# Patient Record
Sex: MALE | Race: WHITE | NOT HISPANIC OR LATINO | Employment: FULL TIME | URBAN - METROPOLITAN AREA
[De-identification: names, ages, dates, MRNs, and addresses within clinical notes are randomized per-mention and may not be internally consistent; named-entity substitution may affect disease eponyms.]

---

## 2017-01-06 ENCOUNTER — ALLIED HEALTH/NURSE VISIT (OUTPATIENT)
Dept: CARDIOLOGY | Facility: CLINIC | Age: 37
End: 2017-01-06
Attending: FAMILY MEDICINE
Payer: COMMERCIAL

## 2017-01-06 DIAGNOSIS — R00.2 PALPITATIONS: ICD-10-CM

## 2017-01-06 PROCEDURE — 0296T ZIO PATCH HOLTER: CPT

## 2017-01-06 PROCEDURE — 0298T ZZC EXT ECG > 48HR TO 21 DAY REVIEW AND INTERPRETATN: CPT

## 2017-01-06 NOTE — PROGRESS NOTES
Patient has been prescribed a ZioPatch holter for 7 days.  Patient was instructed regarding the indication, function, care and prompt return of the ZioPatch holter monitor. The monitor, with S/N [id number],  was placed on the patient with instructions regarding care of the skin, electrodes, and monitor, as well as documentation in the patient diary. Patient demonstrated understanding of this information and agreed to call [employer] with further questions or concerns.

## 2017-02-06 ENCOUNTER — MYC MEDICAL ADVICE (OUTPATIENT)
Dept: FAMILY MEDICINE | Facility: CLINIC | Age: 37
End: 2017-02-06

## 2017-02-06 DIAGNOSIS — F41.1 GENERALIZED ANXIETY DISORDER: Primary | ICD-10-CM

## 2017-02-07 RX ORDER — CITALOPRAM HYDROBROMIDE 20 MG/1
TABLET ORAL
Qty: 30 TABLET | Refills: 0 | Status: SHIPPED | OUTPATIENT
Start: 2017-02-07 | End: 2017-03-23

## 2017-03-23 ENCOUNTER — MYC REFILL (OUTPATIENT)
Dept: FAMILY MEDICINE | Facility: CLINIC | Age: 37
End: 2017-03-23

## 2017-03-23 DIAGNOSIS — F41.1 GENERALIZED ANXIETY DISORDER: ICD-10-CM

## 2017-03-23 RX ORDER — CITALOPRAM HYDROBROMIDE 20 MG/1
20 TABLET ORAL DAILY
Qty: 30 TABLET | Refills: 0 | Status: SHIPPED | OUTPATIENT
Start: 2017-03-23 | End: 2017-04-05

## 2017-03-23 NOTE — TELEPHONE ENCOUNTER
citalopram (CELEXA) 20 MG tablet  Last Written Prescription Date: 2/7/17  Last Fill Quantity: 30, # refills: 0  Last Office Visit with [facility] [place] care provider:  12/13/16        Last PHQ-9 score on record=   PHQ-9 SCORE 12/13/2016   Total Score -   Total Score 1

## 2017-03-23 NOTE — TELEPHONE ENCOUNTER
Medication is being filled for 1 time refill only due to:  patient was asked to f/u in 3-4 weeks from start of medication. please call patient to schedule OV or phone visit     Sindy Foy RN, BSN

## 2017-03-23 NOTE — TELEPHONE ENCOUNTER
[facility]  Original authorizing provider: WATSON REGALADO MD, MD Ashutosh Velasco would like a refill of the following medications:  citalopram (CELEXA) 20 MG tablet [WATSON REGALADO MD, MD]    Preferred pharmacy: [facility] #[id number] [place], [place] - [address]    Comment:

## 2017-03-30 NOTE — PROGRESS NOTES
SUBJECTIVE:                                                    Ashutosh Velasco is a 36 year old male who presents to clinic today for the following health issues:      Anxiety Follow-Up    Status since last visit: Improved     Other associated symptoms:None    Complicating factors:   Significant life event: No Going to get  in few weeks  Current substance abuse: None  Depression symptoms: No  KVNG-7 SCORE 8/17/2011 10/28/2011 12/13/2016   Total Score 12 4 -   Total Score - - 4        GAD7       Has been on this medication in the past.   He did well then.   He had been in the clinic previously for palpitations.             Amount of exercise or physical activity: 4-5 days/week for an average of 45-60 minutes    Problems taking medications regularly: No    Medication side effects: none    Diet: regular (no restrictions)     LEFT SHOULDER PAIN constant. Dull ache. Worse at times.  is bothering him for a month now. Was having spasms in the back. An ache is noted. No injury. No history of issues with the shoulder. Was able to dig a ditch over the weekend. No radiating pain. Occasionally takes ibuprofen. Not regularly. Feels week with lifting with that arm at times.           Problem list and histories reviewed & adjusted, as indicated.  Additional history: as documented        Reviewed and updated as needed this visit by clinical staff  Tobacco  Allergies  Med Hx  Surg Hx  Fam Hx  Soc Hx      Reviewed and updated as needed this visit by Provider         ROS:  C: NEGATIVE for fever, chills, change in weight  E/M: NEGATIVE for ear, mouth and throat problems  R: NEGATIVE for significant cough or SOB  CV: NEGATIVE for chest pain, palpitations or peripheral edema  GI: NEGATIVE for nausea, abdominal pain, heartburn, or change in bowel habits  MUSCULOSKELETAL:as above.   N: NEGATIVE for weakness, dizziness or paresthesias    OBJECTIVE:                                                    /78  Pulse 80  Temp 98.3  " F (36.8  C) (Temporal)  Resp 16  Ht 5' 7.64\" (1.718 m)  Wt 143 lb 3.2 oz (65 kg)  BMI 22.01 kg/m2  Body mass index is 22.01 kg/(m^2).  GENERAL APPEARANCE: alert and no distress  NECK: no adenopathy, no asymmetry, masses, or scars and thyroid normal to palpation  RESP: lungs clear to auscultation - no rales, rhonchi or wheezes  CV: regular rates and rhythm, normal S1 S2, no S3 or S4 and no murmur, click or rub  ORTHO:   SHOULDER Exam-Left   Inspection: no swelling, no bruising, no discoloration, no obvious deformity, no asymmetry, no glenohumeral joint anterior bulge, no distal clavicle elevation, no muscle atrophy, no scapular winging   Tenderness of: mild tenderness over the biceps tendong   Range of Motion: Active- forward flexion- normal, abduction- normal, external rotation- normal, internal rotation- normal  Range of Motion: Passive- forward flexion- normal, abduction- normal, external rotation- normal, internal rotation- normal   Strength: forward flexion- 5/5, abduction- 5/5, internal rotation- 5/5, external rotation- 5/5 and bicep- full           MENTAL STATUS EXAM:  Appearance/Behavior: No apparent distress and Neatly groomed  Speech: Normal  Mood/Affect: normal affect  Insight: Fair    Diagnostic Test Results:  none      ASSESSMENT/PLAN:                                                          1. Generalized anxiety disorder  Patient is doing well overall. Feeling better than prior to medication.   Tolerating medication well.   Wedding is coming up and has some anxiety over the this \"not the marriage, just the wedding\"  He would like to have a few lorazepam on hand if needed. #10 given.   Recheck in about 3 months. Sooner if needed.   - LORazepam (ATIVAN) 0.5 MG tablet; Take 1 tablet (0.5 mg) by mouth every 6 hours as needed for other (panic attacks)  Dispense: 10 tablet; Refill: 0  - citalopram (CELEXA) 20 MG tablet; Take 1 tablet (20 mg) by mouth daily  Dispense: 90 tablet; Refill: 1    2. Acute " pain of left shoulder  Patient with likely tendonitis left shoulder.   He has dull constant ache.   No change in range of motion. No weakness noted.   All questions invited, asked and answered to the patient's apparent satisfaction.  Patient agrees to plan.    Patient Instructions       Recommend ibuprofen 600mg (3 pills of over the counter strength) three times daily with food for 10 days. Stop for any stomach irritation.             WATSON REGALADO MD, MD  [facility]

## 2017-04-05 ENCOUNTER — OFFICE VISIT (OUTPATIENT)
Dept: FAMILY MEDICINE | Facility: CLINIC | Age: 37
End: 2017-04-05
Payer: COMMERCIAL

## 2017-04-05 VITALS
SYSTOLIC BLOOD PRESSURE: 144 MMHG | HEIGHT: 68 IN | HEART RATE: 80 BPM | TEMPERATURE: 98.3 F | RESPIRATION RATE: 16 BRPM | DIASTOLIC BLOOD PRESSURE: 80 MMHG | BODY MASS INDEX: 21.7 KG/M2 | WEIGHT: 143.2 LBS

## 2017-04-05 DIAGNOSIS — F41.1 GENERALIZED ANXIETY DISORDER: Primary | ICD-10-CM

## 2017-04-05 DIAGNOSIS — M25.512 ACUTE PAIN OF LEFT SHOULDER: ICD-10-CM

## 2017-04-05 PROCEDURE — 99214 OFFICE O/P EST MOD 30 MIN: CPT | Performed by: FAMILY MEDICINE

## 2017-04-05 RX ORDER — CITALOPRAM HYDROBROMIDE 20 MG/1
20 TABLET ORAL DAILY
Qty: 90 TABLET | Refills: 1 | Status: SHIPPED | OUTPATIENT
Start: 2017-04-05 | End: 2017-11-06

## 2017-04-05 RX ORDER — LORAZEPAM 0.5 MG/1
0.5 TABLET ORAL EVERY 6 HOURS PRN
Qty: 10 TABLET | Refills: 0 | Status: SHIPPED | OUTPATIENT
Start: 2017-04-05 | End: 2017-09-21

## 2017-04-05 ASSESSMENT — ANXIETY QUESTIONNAIRES
3. WORRYING TOO MUCH ABOUT DIFFERENT THINGS: SEVERAL DAYS
GAD7 TOTAL SCORE: 5
1. FEELING NERVOUS, ANXIOUS, OR ON EDGE: SEVERAL DAYS
2. NOT BEING ABLE TO STOP OR CONTROL WORRYING: SEVERAL DAYS
IF YOU CHECKED OFF ANY PROBLEMS ON THIS QUESTIONNAIRE, HOW DIFFICULT HAVE THESE PROBLEMS MADE IT FOR YOU TO DO YOUR WORK, TAKE CARE OF THINGS AT HOME, OR GET ALONG WITH OTHER PEOPLE: SOMEWHAT DIFFICULT
6. BECOMING EASILY ANNOYED OR IRRITABLE: NOT AT ALL
5. BEING SO RESTLESS THAT IT IS HARD TO SIT STILL: SEVERAL DAYS
7. FEELING AFRAID AS IF SOMETHING AWFUL MIGHT HAPPEN: SEVERAL DAYS

## 2017-04-05 ASSESSMENT — PAIN SCALES - GENERAL: PAINLEVEL: MILD PAIN (3)

## 2017-04-05 ASSESSMENT — PATIENT HEALTH QUESTIONNAIRE - PHQ9: 5. POOR APPETITE OR OVEREATING: NOT AT ALL

## 2017-04-05 NOTE — PATIENT INSTRUCTIONS
Recheck in 2-3 months or sooner if needed.     Recommend ibuprofen 600mg (3 pills of over the counter strength) three times daily with food for 10 days. Stop for any stomach irritation.

## 2017-04-05 NOTE — NURSING NOTE
"Chief Complaint   Patient presents with     Anxiety       Initial /78  Pulse 80  Temp 98.3  F (36.8  C) (Temporal)  Resp 16  Ht 5' 7.64\" (1.718 m)  Wt 143 lb 3.2 oz (65 kg)  BMI 22.01 kg/m2 Estimated body mass index is 22.01 kg/(m^2) as calculated from the following:    Height as of this encounter: 5' 7.64\" (1.718 m).    Weight as of this encounter: 143 lb 3.2 oz (65 kg).  Medication Reconciliation: complete  "

## 2017-04-05 NOTE — MR AVS SNAPSHOT
After Visit Summary   4/5/2017    Ashutosh Velasco    MRN: [id number]           Patient Information     Date Of Birth          1980        Visit Information        Provider Department      4/5/2017 1:00 PM Veronica Bay MD [facility]        Today's Diagnoses     Generalized anxiety disorder          Care Instructions    Recheck in 2-3 months or sooner if needed.     Recommend ibuprofen 600mg (3 pills of over the counter strength) three times daily with food for 10 days. Stop for any stomach irritation.            Follow-ups after your visit        Who to contact     If you have questions or need follow up information about today's clinic visit or your schedule please contact [facility] directly at [phone].  Normal or non-critical lab and imaging results will be communicated to you by Angiologixhart, letter or phone within 4 business days after the clinic has received the results. If you do not hear from us within 7 days, please contact the clinic through Angiolog[employer]hart or phone. If you have a critical or abnormal lab result, we will notify you by phone as soon as possible.  Submit refill requests through POS on CLOUD or call your pharmacy and they will forward the refill request to us. Please allow 3 business days for your refill to be completed.          Additional Information About Your Visit        MyChart Information     POS on CLOUD gives you secure access to your electronic health record. If you see a primary care provider, you can also send messages to your care team and make appointments. If you have questions, please call your primary care clinic.  If you do not have a primary care provider, please call [phone] and they will assist you.        Care EveryWhere ID     This is your Care EveryWhere ID. This could be used by other organizations to access your [place] medical records  [id number]        Your Vitals Were     Pulse Temperature Respirations Height BMI (Body Mass  "Index)       80 98.3  F (36.8  C) (Temporal) 16 5' 7.64\" (1.718 m) 22.01 kg/m2        Blood Pressure from Last 3 Encounters:   04/05/17 142/78   12/13/16 120/70   05/29/13 (!) 130/96    Weight from Last 3 Encounters:   04/05/17 143 lb 3.2 oz (65 kg)   12/13/16 151 lb 4.8 oz (68.6 kg)   05/29/13 162 lb 4.8 oz (73.6 kg)              Today, you had the following     No orders found for display         Today's Medication Changes          These changes are accurate as of: 4/5/17  1:41 PM.  If you have any questions, ask your nurse or doctor.               Start taking these medicines.        Dose/Directions    LORazepam 0.5 MG tablet   Commonly known as:  ATIVAN   Used for:  Generalized anxiety disorder   Started by:  Veronica Bay MD        Dose:  0.5 mg   Take 1 tablet (0.5 mg) by mouth every 6 hours as needed for other (panic attacks)   Quantity:  10 tablet   Refills:  0            Where to get your medicines      These medications were sent to [facility][id number] - [place], [place] - [address]  [address], [facility] [address]    Hours:  test Rx sent successfully 12/26/02  KR Phone:  [phone]     citalopram 20 MG tablet         Some of these will need a paper prescription and others can be bought over the counter.  Ask your nurse if you have questions.     Bring a paper prescription for each of these medications     LORazepam 0.5 MG tablet                Primary Care Provider Office Phone # Fax #    Neal Molina [phone]       [facility] [address] [facility] [address]        Thank you!     Thank you for choosing [facility]  for your care. Our goal is always to provide you with excellent care. Hearing back from our patients is one way we can continue to improve our services. Please take a few minutes to complete the written survey that you may receive in the mail after your visit with us. Thank you!             Your Updated Medication " List - Protect others around you: Learn how to safely use, store and throw away your medicines at www.disposemymeds.org.          This list is accurate as of: 4/5/17  1:41 PM.  Always use your most recent med list.                   Brand Name Dispense Instructions for use    citalopram 20 MG tablet    celeXA    90 tablet    Take 1 tablet (20 mg) by mouth daily       LORazepam 0.5 MG tablet    ATIVAN    10 tablet    Take 1 tablet (0.5 mg) by mouth every 6 hours as needed for other (panic attacks)       MULTIVITAMINS PO      Take 1 tablet by mouth daily.

## 2017-04-06 ASSESSMENT — ANXIETY QUESTIONNAIRES: GAD7 TOTAL SCORE: 5

## 2017-04-06 ASSESSMENT — PATIENT HEALTH QUESTIONNAIRE - PHQ9: SUM OF ALL RESPONSES TO PHQ QUESTIONS 1-9: 5

## 2017-09-21 ENCOUNTER — MYC REFILL (OUTPATIENT)
Dept: FAMILY MEDICINE | Facility: CLINIC | Age: 37
End: 2017-09-21

## 2017-09-21 DIAGNOSIS — F41.1 GENERALIZED ANXIETY DISORDER: ICD-10-CM

## 2017-09-21 RX ORDER — LORAZEPAM 0.5 MG/1
0.5 TABLET ORAL EVERY 8 HOURS PRN
Qty: 10 TABLET | Refills: 0 | Status: SHIPPED | OUTPATIENT
Start: 2017-09-21 | End: 2019-11-01

## 2017-09-21 NOTE — TELEPHONE ENCOUNTER
Lorazepam 0.5 mg refilled for #10 tablets. Last seen April 2017. Last refill was over 5 months ago.    Bony Benentt MD

## 2017-09-21 NOTE — TELEPHONE ENCOUNTER
LORazepam (ATIVAN) 0.5 MG tablet     Last Written Prescription Date: 04/05/2017  Last Fill Quantity: 10,  # refills: 0   Last Office Visit with [facility] [place] or [facility] prescribing provider: 04/05/2017    LORazepam (ATIVAN) 0.5 MG tablet  Routing refill request to provider for review/approval because:  Drug not active on patient's medication list  Due for 3 month follow up  Carmenza Dalal RN, BSN

## 2017-09-21 NOTE — TELEPHONE ENCOUNTER
Message from [facility]:  Original authorizing provider: WATSON REGALADO MD, MD Ashutosh Velasco would like a refill of the following medications:  LORazepam (ATIVAN) 0.5 MG tablet [WATSON REGALADO MD, MD]    Preferred pharmacy: [facility] #[id number] [place], [place] - [address]    Comment:

## 2017-09-29 ENCOUNTER — TELEPHONE (OUTPATIENT)
Dept: FAMILY MEDICINE | Facility: CLINIC | Age: 37
End: 2017-09-29

## 2017-09-29 NOTE — TELEPHONE ENCOUNTER
Rupa Crooks from [facility] calling. She would like a call from Dr. Bay about the assessment she did. Please call her at [phone].  Thank you,  Yolis Walters- Pt Rep.

## 2017-09-29 NOTE — TELEPHONE ENCOUNTER
"Called back. Did chemical dependency evaluation. Came in to address his drinking problem. He has been \"sneaking off\" to drink. Appears to be alcohol use disorder, mild. He is having anxiety. He is recommended for individual therapy. Agree with plan.       "

## 2017-11-06 ENCOUNTER — MYC REFILL (OUTPATIENT)
Dept: FAMILY MEDICINE | Facility: CLINIC | Age: 37
End: 2017-11-06

## 2017-11-06 DIAGNOSIS — F41.1 GENERALIZED ANXIETY DISORDER: ICD-10-CM

## 2017-11-06 RX ORDER — CITALOPRAM HYDROBROMIDE 20 MG/1
20 TABLET ORAL DAILY
Qty: 90 TABLET | Refills: 0 | Status: SHIPPED | OUTPATIENT
Start: 2017-11-06 | End: 2018-02-12

## 2017-11-06 NOTE — TELEPHONE ENCOUNTER
Refill for three months placed--for KVNG. Did send a reminder through My Chart to recommend a follow up visit for a six month follow up.  Bony Bennett MD

## 2017-11-06 NOTE — TELEPHONE ENCOUNTER
Celexa:  Routing refill request to provider for review/approval because:  Patient needs to be seen because: overdue for follow up     Irene Keating RN, BSN

## 2017-11-06 NOTE — TELEPHONE ENCOUNTER
[facility] from [facility]:  Original authorizing provider: WATSON REGALADO MD, MD Ashutosh Velasco would like a refill of the following medications:  citalopram (CELEXA) 20 MG tablet [WATSON REGALADO MD, MD]    Preferred pharmacy: [facility] #[id number] [place], [place] - [address]    Comment:

## 2017-11-30 ENCOUNTER — TELEPHONE (OUTPATIENT)
Dept: FAMILY MEDICINE | Facility: CLINIC | Age: 37
End: 2017-11-30

## 2017-11-30 DIAGNOSIS — F41.1 GENERALIZED ANXIETY DISORDER: Primary | ICD-10-CM

## 2017-11-30 DIAGNOSIS — F10.10 ALCOHOL ABUSE: ICD-10-CM

## 2018-01-05 ENCOUNTER — OFFICE VISIT (OUTPATIENT)
Dept: PSYCHOLOGY | Facility: CLINIC | Age: 38
End: 2018-01-05
Attending: FAMILY MEDICINE
Payer: COMMERCIAL

## 2018-01-05 DIAGNOSIS — F41.1 GENERALIZED ANXIETY DISORDER: Primary | ICD-10-CM

## 2018-01-05 DIAGNOSIS — F10.10 ALCOHOL ABUSE: ICD-10-CM

## 2018-01-05 PROCEDURE — 90791 PSYCH DIAGNOSTIC EVALUATION: CPT | Performed by: COUNSELOR

## 2018-01-05 ASSESSMENT — ANXIETY QUESTIONNAIRES
6. BECOMING EASILY ANNOYED OR IRRITABLE: SEVERAL DAYS
GAD7 TOTAL SCORE: 5
5. BEING SO RESTLESS THAT IT IS HARD TO SIT STILL: NOT AT ALL
3. WORRYING TOO MUCH ABOUT DIFFERENT THINGS: SEVERAL DAYS
1. FEELING NERVOUS, ANXIOUS, OR ON EDGE: SEVERAL DAYS
IF YOU CHECKED OFF ANY PROBLEMS ON THIS QUESTIONNAIRE, HOW DIFFICULT HAVE THESE PROBLEMS MADE IT FOR YOU TO DO YOUR WORK, TAKE CARE OF THINGS AT HOME, OR GET ALONG WITH OTHER PEOPLE: SOMEWHAT DIFFICULT
2. NOT BEING ABLE TO STOP OR CONTROL WORRYING: SEVERAL DAYS
7. FEELING AFRAID AS IF SOMETHING AWFUL MIGHT HAPPEN: NOT AT ALL

## 2018-01-05 ASSESSMENT — PATIENT HEALTH QUESTIONNAIRE - PHQ9
SUM OF ALL RESPONSES TO PHQ QUESTIONS 1-9: 2
5. POOR APPETITE OR OVEREATING: SEVERAL DAYS

## 2018-01-05 NOTE — Clinical Note
Hi Dr. Bay,  I got to see Ashutosh today.  I am continuing with the anxiety diagnosis.  We are going to work on that and him sustaining sobriety from alcohol! Let me know if you have any questions.  Thanks!  April Graff MA,[facility]
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
## 2018-01-05 NOTE — MR AVS SNAPSHOT
MRN:[id number]                      After Visit Summary   1/5/2018    Ashutosh Velasco    MRN: [id number]           Visit Information        Provider Department      1/5/2018 10:00 AM [facility] Graff [facility]      Your next 10 appointments already scheduled     Jan 12, 2018  2:00 PM CST   Return Visit with April Graff [facility] ([facility])    [address]  [facility] [address]   [phone]            Feb 02, 2018  1:00 PM CST   Return Visit with April Graff [facility] ([facility])    [address]  [facility] [address]   [phone]            Feb 16, 2018  2:00 PM CST   Return Visit with April Graff VIC   [facility] ([facility])    [address]  [facility] [address]   [phone]              MyChart Information     [facility] gives you secure access to your electronic health record. If you see a primary care provider, you can also send messages to your care team and make appointments. If you have questions, please call your primary care clinic.  If you do not have a primary care provider, please call [phone] and they will assist you.        Care EveryWhere ID     This is your Care EveryWhere ID. This could be used by other organizations to access your [facility] medical records  [id number]        Equal Access to Services     LAURITA REYNOSO : Hadii chuck ventura hadasho Soeliecerali, waaxda luqadaha, qaybta kaalmada adeegyakathleen, waxay mike ann adejoss ballesteros . So [facility] [phone].    ATENCIÓN: Si habla español, tiene a schreiber disposición servicios gratuitos de asistencia lingüística. Llame al [phone].    We comply with applicable federal civil rights laws and [place] laws. We do not discriminate on the basis of race, color, national origin, age, disability,  sex, sexual orientation, or gender identity.

## 2018-01-06 ASSESSMENT — ANXIETY QUESTIONNAIRES: GAD7 TOTAL SCORE: 5

## 2018-01-12 ENCOUNTER — OFFICE VISIT (OUTPATIENT)
Dept: PSYCHOLOGY | Facility: CLINIC | Age: 38
End: 2018-01-12
Attending: FAMILY MEDICINE
Payer: COMMERCIAL

## 2018-01-12 DIAGNOSIS — F41.1 GENERALIZED ANXIETY DISORDER: Primary | ICD-10-CM

## 2018-01-12 PROCEDURE — 90837 PSYTX W PT 60 MINUTES: CPT | Performed by: COUNSELOR

## 2018-01-12 NOTE — MR AVS SNAPSHOT
MRN:[id number]                      After Visit Summary   1/12/2018    Ashutosh Velasco    MRN: [id number]           Visit Information        Provider Department      1/12/2018 2:00 PM April Graff LPC [facility] Generic      Your next 10 appointments already scheduled     Feb 02, 2018  1:00 PM CST   Return Visit with April Graff VIC   [facility] ([facility])    [address]  [facility] [address] [phone]            Feb 16, 2018  2:00 PM CST   Return Visit with April Graff VIC   [facility] ([facility])    [address]  [facility] [id number] [phone]              MyChart Information     [facility] gives you secure access to your electronic health record. If you see a primary care provider, you can also send messages to your care team and make appointments. If you have questions, please call your primary care clinic.  If you do not have a primary care provider, please call [phone] and they will assist you.        Care EveryWhere ID     This is your Care EveryWhere ID. This could be used by other organizations to access your [facility] medical records  [id number]        Equal Access to Services     LAURITA REYNOSO : Caroline duvalo Harry, waaxda luqadaha, qaybta kaalmada adejossyada, oanh holt. So [facility] [phone].    ATENCIÓN: Si habla español, tiene a schreiber disposición servicios gratuitos de asistencia lingüística. Llsae al [phone].    We comply with applicable federal civil rights laws and [place] laws. We do not discriminate on the basis of race, color, national origin, age, disability, sex, sexual orientation, or gender identity.

## 2018-01-15 NOTE — PROGRESS NOTES
Progress Note    Client Name: Ashutosh Velasco  Date: 1/12/18         Service Type: Individual      Session Start Time: 2:00pm  Session End Time: 3:00pm      Session Length: 60 mins     Session #: 2     Attendees: Client attended alone    Treatment Plan Last Reviewed: Created today (1/12/18)  PHQ-9 / KVNG-7 : See chart     DATA      Progress Since Last Session (Related to Symptoms / Goals / Homework):   Symptoms: Stable    Homework: Completed in session      Episode of Care Goals: Minimal progress - PREPARATION (Decided to change - considering how); Intervened by negotiating a change plan and determining options / strategies for behavior change, identifying triggers, exploring social supports, and working towards setting a date to begin behavior change     Current / Ongoing Stressors and Concerns:   Client stated he continues to struggle with anxiety this week.  He talked about how he would like to communicate better with his wife and not feel so anxious about certain things he chooses to do because of questions she might ask about it.  He stated he can get extremely anxious and choose not to do the activity or become apologetic when he doesn't need to be if she asks him questions about how long he will be gone.  He feels like she is judging his choice and doesn't want him to go. He will make up a lot of different interpretations.  He stated he likes it better when she is supportive of his decision to go out first then ask questions afterwards. He said he really don't have his own friends here and would like to make some.  Most of his friends are mutual friends because of his wife.       Treatment Objective(s) Addressed in This Session:   use thought-stopping strategy daily to reduce intrusive thoughts  compile a list of boundaries that they would like to set with others. With wife  identify two areas of life that you would like to have improved  functioning       Intervention:   Motivational Interviewing: . Motivational Interviewing  Target Behavior: anxiety (ruminations and increase ability to go out and do things) and communication skills    Stage of Change: PREPARATION (Decided to change - considering how)    MI Intervention: Expressed Empathy/Understanding, Supported Autonomy, Collaboration, Evocation, Permission to raise concern or advise, Open-ended questions, Reflections: simple and complex, Change talk (evoked) and Reframe     Change Talk Expressed by the Patient: Desire to change Ability to change Reasons to change Need to change Committment to change Activation Taking steps    Provider Response to Change Talk: E - Evoked more info from patient about behavior change, A - Affirmed patient's thoughts, decisions, or attempts at behavior change, R - Reflected patient's change talk and S - Summarized patient's change talk statements          ASSESSMENT: Current Emotional / Mental Status (status of significant symptoms):   Risk status (Self / Other harm or suicidal ideation)   Client denies current fears or concerns for personal safety.   Client denies current or recent suicidal ideation or behaviors.   Client denies current or recent homicidal ideation or behaviors.   Client denies current or recent self injurious behavior or ideation.   Client denies other safety concerns.   A safety and risk management plan has not been developed at this time, however client was given the after-hours number / 911 should there be a change in any of these risk factors.     Appearance:   Appropriate    Eye Contact:   Good    Psychomotor Behavior: Normal    Attitude:   Cooperative    Orientation:   All   Speech    Rate / Production: Normal     Volume:  Normal    Mood:    Anxious  Normal   Affect:    Appropriate    Thought Content:  Clear    Thought Form:  Coherent  Logical    Insight:    Good      Medication Review:   No changes to current psychiatric  medication(s)     Medication Compliance:   Yes     Changes in Health Issues:   None reported     Chemical Use Review:   Substance Use: Chemical use reviewed, no active concerns identified      Tobacco Use: No change in amount of tobacco use since last session.  Patient declined discussion at this time     Collateral Reports Completed:   Not Applicable    PLAN: (Client Tasks / Therapist Tasks / Other)  Client to read distress tolerance materials.        April Graff, [facility]                                                         ________________________________________________________________________    Treatment Plan    Client's Name: Ashutosh Velasco  YOB: 1980    Date: 1/12/18    DSM-V Diagnoses: 300.02 (F41.1) Generalized Anxiety Disorder  Psychosocial / Contextual Factors: Recently , moved to [place] from [place] in 2009, alcohol abuse  WHODAS: 20    Referral / Collaboration:  Referral to another professional/service is not indicated at this time..    Anticipated number of session or this episode of care: 5-15      MeasurableTreatment Goal(s) related to diagnosis / functional impairment(s)  Goal 1: Client will decrease acting on anxious urges from 10 out of 10 opportunities to at most 9 out of 10 opportunities for at least 3 consecutive weeks as evidenced by client report and KVNG scores.    Objective #A (Client Action)    Client will use relaxation strategies 2-5 times per day to reduce the physical symptoms of anxiety.  Status: New - Date: 1/12/18     Intervention(s)  Therapist will teach emotional recognition/identification. emotion regulation skills, distress tolerance skills, interpersonal effectiveness skills, mindfulness skills, CBT skills.    Objective #B  Client will decrease trichotillomania behaviors.  Status: New - Date: 1/12/18     Intervention(s)  Therapist will teach relaxation skills, distraction skills, behavior modification techniques, mindfulness skills.    Objective #C  Client  will identify signs of anxiety, triggers of anxiety, decrease racing thoughts, be able to handle being at events where other people are drinking and not give in to urge to drink to decrease anxiety.  Status: New - Date: 1/12/18     Intervention(s)  Therapist will teach emotion regulation skills, distress tolerance skills, mindfulness skills, interpersonal skills, CBT though changing skills.      Goal 2: Client will increase ability to communicate effectively with his wife from 3 out of 10 opportunities to at least 4 out of 10 opportunities for at elast 3 consecutive weeks as evidenced by client report and in session practice.     Objective #A (Client Action)    Status: New - Date: 1/12/18     Client will compile a list of boundaries that they would like to set with others. How to communicate wants and desires with wife.    Intervention(s)  Therapist will teach emotion regulation skills, distress tolerance skills, mindfulness skills, interpersonal skills, CBT though changing skills.    Objective #B  Client will practice setting boundaries 2 times in the next 1 weeks.    Status: New - Date: 1/12/18     Intervention(s)  Therapist will teach about healthy boundaries. emotion regulation skills, distress tolerance skills, mindfulness skills, interpersonal skills, CBT though changing skills.    Objective #C  Client will increase going out with friends/making friends.  Status: New - Date: 1/12/18     Intervention(s)  Therapist will teach emotion regulation skills, distress tolerance skills, mindfulness skills, interpersonal skills, CBT though changing skills.    Client has reviewed and agreed to the above plan.      April Graff, [facility]  January 12, 2018

## 2018-02-02 ENCOUNTER — OFFICE VISIT (OUTPATIENT)
Dept: PSYCHOLOGY | Facility: CLINIC | Age: 38
End: 2018-02-02
Payer: COMMERCIAL

## 2018-02-02 DIAGNOSIS — F41.1 GENERALIZED ANXIETY DISORDER: Primary | ICD-10-CM

## 2018-02-02 PROCEDURE — 90837 PSYTX W PT 60 MINUTES: CPT | Performed by: COUNSELOR

## 2018-02-02 NOTE — MR AVS SNAPSHOT
MRN:[id number]                      After Visit Summary   2/2/2018    Ashutosh Velasco    MRN: [id number]           Visit Information        Provider Department      2/2/2018 1:00 PM April Graff, [facility] [place] [facility]      Your next 10 appointments already scheduled     Feb 16, 2018  2:00 PM CST   Return Visit with April Graff [facility] [place] ([facility] [place])    [address]  [place] [address]   [phone]            Mar 09, 2018  2:00 PM CST   Return Visit with April Caroregan Graff [facility] [place] ([facility] [place])    [address]  [place] [address]   [phone]            Mar 30, 2018  2:00 PM CDT   Return Visit with April Graff [facility] [place] ([facility] [place])    [address]  [place] [address]   [phone]            Apr 13, 2018  2:00 PM CDT   Return Visit with April Graff [facility] [place] ([facility] [place])    [address]  [facility] [address]   [phone]              MyChart Information     [employer] gives you secure access to your electronic health record. If you see a primary care provider, you can also send messages to your care team and make appointments. If you have questions, please call your primary care clinic.  If you do not have a primary care provider, please call [phone] and they will assist you.        Care EveryWhere ID     This is your Care EveryWhere ID. This could be used by other organizations to access your Mobile medical records  [id number]        Equal Access to Services     LAURITA REYNOSO AH: Hadii chuck Mcdonald, wadebbieda luqadaha, qaybta kaalmada adeegyada, oanh winters So [facility] [phone].    ATENCIÓN: Si habla español, tiene a schreiber disposición servicios gratuitos de  asistencia lingüística. Paulo al [phone].    We comply with applicable federal civil rights laws and [place] laws. We do not discriminate on the basis of race, color, national origin, age, disability, sex, sexual orientation, or gender identity.

## 2018-02-03 NOTE — PROGRESS NOTES
"                                             Progress Note    Client Name: Ashutosh Velasco  Date: 2/2/18         Service Type: Individual      Session Start Time: 1:00pm  Session End Time: 2:00pm      Session Length: 60 mins     Session #: 3     Attendees: Client attended alone    Treatment Plan Last Reviewed: Created today (1/12/18)  PHQ-9 / KVNG-7 : See chart     DATA      Progress Since Last Session (Related to Symptoms / Goals / Homework):   Symptoms: Stable    Homework: Completed in session      Episode of Care Goals: Minimal progress - PREPARATION (Decided to change - considering how); Intervened by negotiating a change plan and determining options / strategies for behavior change, identifying triggers, exploring social supports, and working towards setting a date to begin behavior change     Current / Ongoing Stressors and Concerns:   Client stated he had some situations where he went to come social gatherings and everyone else was drinking. He continues to have thoughts of \"it would be much more fun if I could drink with everyone.\"  He feels that he will have to experience several events over time where he can't drink so he knows what to expect later in the future. He continues to work on his negative self talk and decreasing apologizing to his wife all the time.  He often feels bad when she gives him feedback about something he did that she didn't like or hurt her.  He said he feels bad mostly because he hurt someone. He considers himself to be a perfectionist and always wants to do the best and struggles to say no to things.      Treatment Objective(s) Addressed in This Session:   use thought-stopping strategy daily to reduce intrusive thoughts  compile a list of boundaries that they would like to set with others. With wife  identify two areas of life that you would like to have improved functioning       Intervention:   Motivational Interviewing: . Motivational Interviewing  Target Behavior: anxiety " (ruminations and increase ability to go out and do things) and communication skills    Stage of Change: PREPARATION (Decided to change - considering how)    MI Intervention: Expressed Empathy/Understanding, Supported Autonomy, Collaboration, Evocation, Permission to raise concern or advise, Open-ended questions, Reflections: simple and complex, Change talk (evoked) and Reframe     Change Talk Expressed by the Patient: Desire to change Ability to change Reasons to change Need to change Committment to change Activation Taking steps    Provider Response to Change Talk: E - Evoked more info from patient about behavior change, A - Affirmed patient's thoughts, decisions, or attempts at behavior change, R - Reflected patient's change talk and S - Summarized patient's change talk statements          ASSESSMENT: Current Emotional / Mental Status (status of significant symptoms):   Risk status (Self / Other harm or suicidal ideation)   Client denies current fears or concerns for personal safety.   Client denies current or recent suicidal ideation or behaviors.   Client denies current or recent homicidal ideation or behaviors.   Client denies current or recent self injurious behavior or ideation.   Client denies other safety concerns.   A safety and risk management plan has not been developed at this time, however client was given the after-hours number / 911 should there be a change in any of these risk factors.     Appearance:   Appropriate    Eye Contact:   Good    Psychomotor Behavior: Normal    Attitude:   Cooperative    Orientation:   All   Speech    Rate / Production: Normal     Volume:  Normal    Mood:    Anxious  Normal   Affect:    Appropriate    Thought Content:  Clear    Thought Form:  Coherent  Logical    Insight:    Good      Medication Review:   No changes to current psychiatric medication(s)     Medication Compliance:   Yes     Changes in Health Issues:   None reported     Chemical Use Review:   Substance Use:  Chemical use reviewed, no active concerns identified      Tobacco Use: No change in amount of tobacco use since last session.  Patient declined discussion at this time     Collateral Reports Completed:   Not Applicable    PLAN: (Client Tasks / Therapist Tasks / Other)  Client to read distress tolerance materials. Work on combating negative thoughts.         April Graff, [facility]                                                         ________________________________________________________________________    Treatment Plan    Client's Name: Ashutosh Velasco  YOB: 1980    Date: 1/12/18    DSM-V Diagnoses: 300.02 (F41.1) Generalized Anxiety Disorder  Psychosocial / Contextual Factors: Recently , moved to [place] from [place] in 2009, alcohol abuse  WHODAS: 20    Referral / Collaboration:  Referral to another professional/service is not indicated at this time..    Anticipated number of session or this episode of care: 5-15      MeasurableTreatment Goal(s) related to diagnosis / functional impairment(s)  Goal 1: Client will decrease acting on anxious urges from 10 out of 10 opportunities to at most 9 out of 10 opportunities for at least 3 consecutive weeks as evidenced by client report and KVNG scores.    Objective #A (Client Action)    Client will use relaxation strategies 2-5 times per day to reduce the physical symptoms of anxiety.  Status: New - Date: 1/12/18     Intervention(s)  Therapist will teach emotional recognition/identification. emotion regulation skills, distress tolerance skills, interpersonal effectiveness skills, mindfulness skills, CBT skills.    Objective #B  Client will decrease trichotillomania behaviors.  Status: New - Date: 1/12/18     Intervention(s)  Therapist will teach relaxation skills, distraction skills, behavior modification techniques, mindfulness skills.    Objective #C  Client will identify signs of anxiety, triggers of anxiety, decrease racing thoughts, be able to handle  being at events where other people are drinking and not give in to urge to drink to decrease anxiety.  Status: New - Date: 1/12/18     Intervention(s)  Therapist will teach emotion regulation skills, distress tolerance skills, mindfulness skills, interpersonal skills, CBT though changing skills.      Goal 2: Client will increase ability to communicate effectively with his wife from 3 out of 10 opportunities to at least 4 out of 10 opportunities for at elast 3 consecutive weeks as evidenced by client report and in session practice.     Objective #A (Client Action)    Status: New - Date: 1/12/18     Client will compile a list of boundaries that they would like to set with others. How to communicate wants and desires with wife.    Intervention(s)  Therapist will teach emotion regulation skills, distress tolerance skills, mindfulness skills, interpersonal skills, CBT though changing skills.    Objective #B  Client will practice setting boundaries 2 times in the next 1 weeks.    Status: New - Date: 1/12/18     Intervention(s)  Therapist will teach about healthy boundaries. emotion regulation skills, distress tolerance skills, mindfulness skills, interpersonal skills, CBT though changing skills.    Objective #C  Client will increase going out with friends/making friends.  Status: New - Date: 1/12/18     Intervention(s)  Therapist will teach emotion regulation skills, distress tolerance skills, mindfulness skills, interpersonal skills, CBT though changing skills.    Client has reviewed and agreed to the above plan.      April Graff [facility]

## 2018-02-12 ENCOUNTER — MYC REFILL (OUTPATIENT)
Dept: FAMILY MEDICINE | Facility: CLINIC | Age: 38
End: 2018-02-12

## 2018-02-12 DIAGNOSIS — F41.1 GENERALIZED ANXIETY DISORDER: ICD-10-CM

## 2018-02-12 RX ORDER — CITALOPRAM HYDROBROMIDE 20 MG/1
20 TABLET ORAL DAILY
Qty: 30 TABLET | Refills: 0 | Status: SHIPPED | OUTPATIENT
Start: 2018-02-12 | End: 2018-03-26

## 2018-02-12 NOTE — TELEPHONE ENCOUNTER
Routing to Dr. Bay for review--I have not seen this patient. Last refill under my name was due to covering for Shanique Haro.Last seen in April 2017 for initial evaluation but has not followed through with requested appointments.    Bony Bennett MD

## 2018-02-12 NOTE — TELEPHONE ENCOUNTER
Reviewed chart. Seeing a counselor. Due for office visit - please assist in scheduling. One month refill given.

## 2018-02-12 NOTE — TELEPHONE ENCOUNTER
Message from [employer]:  Original authorizing provider: MD Jonh Jinle Rod would like a refill of the following medications:  citalopram (CELEXA) 20 MG tablet [Bony Bennett MD]    Preferred pharmacy: [id number] [place], [place] - [address]    Comment:

## 2018-02-12 NOTE — TELEPHONE ENCOUNTER
Celexa:  Routing refill request to provider for review/approval because:  Patient needs to be seen because:  Overdue for follow up. Due in October 2017.    Liliana Winkler RN, BSN

## 2018-02-16 ENCOUNTER — OFFICE VISIT (OUTPATIENT)
Dept: PSYCHOLOGY | Facility: CLINIC | Age: 38
End: 2018-02-16
Payer: COMMERCIAL

## 2018-02-16 DIAGNOSIS — F41.1 GENERALIZED ANXIETY DISORDER: Primary | ICD-10-CM

## 2018-02-16 PROCEDURE — 90834 PSYTX W PT 45 MINUTES: CPT | Performed by: COUNSELOR

## 2018-02-16 NOTE — PROGRESS NOTES
Progress Note    Client Name: Ashutosh Velasco  Date: 2/16/18         Service Type: Individual      Session Start Time: 2:00pm  Session End Time: 2:50pm      Session Length: 50 mins     Session #: 4     Attendees: Client attended alone    Treatment Plan Last Reviewed: (1/12/18)  PHQ-9 / KVNG-7 : See chart     DATA      Progress Since Last Session (Related to Symptoms / Goals / Homework):   Symptoms: Stable    Homework: Completed in session      Episode of Care Goals: Minimal progress - PREPARATION (Decided to change - considering how); Intervened by negotiating a change plan and determining options / strategies for behavior change, identifying triggers, exploring social supports, and working towards setting a date to begin behavior change     Current / Ongoing Stressors and Concerns:   Client stated he has been doing well. He stated he's been doing a juice cleanse that is only three days.  The other night he was able to think before he said something sarcastic to his wife that probably would have started an argument.  He was proud of himself for this.  He stated He hasn't really had many obsessive or negative thoughts lately.  Work has been going better as well which may contribute to this.  He stated next week is his fishing trip with his friends from home. He doesn't plan on drinking while he's there.      Treatment Objective(s) Addressed in This Session:   use thought-stopping strategy daily to reduce intrusive thoughts  compile a list of boundaries that they would like to set with others. With wife  identify two areas of life that you would like to have improved functioning       Intervention:   Motivational Interviewing: . Motivational Interviewing  Target Behavior: anxiety (ruminations and increase ability to go out and do things) and communication skills    Stage of Change: PREPARATION (Decided to change - considering how)    MI Intervention: Expressed  Empathy/Understanding, Supported Autonomy, Collaboration, Evocation, Permission to raise concern or advise, Open-ended questions, Reflections: simple and complex, Change talk (evoked) and Reframe     Change Talk Expressed by the Patient: Desire to change Ability to change Reasons to change Need to change Committment to change Activation Taking steps    Provider Response to Change Talk: E - Evoked more info from patient about behavior change, A - Affirmed patient's thoughts, decisions, or attempts at behavior change, R - Reflected patient's change talk and S - Summarized patient's change talk statements          ASSESSMENT: Current Emotional / Mental Status (status of significant symptoms):   Risk status (Self / Other harm or suicidal ideation)   Client denies current fears or concerns for personal safety.   Client denies current or recent suicidal ideation or behaviors.   Client denies current or recent homicidal ideation or behaviors.   Client denies current or recent self injurious behavior or ideation.   Client denies other safety concerns.   A safety and risk management plan has not been developed at this time, however client was given the after-hours number / 911 should there be a change in any of these risk factors.     Appearance:   Appropriate    Eye Contact:   Good    Psychomotor Behavior: Normal    Attitude:   Cooperative    Orientation:   All   Speech    Rate / Production: Normal     Volume:  Normal    Mood:    Anxious  Normal   Affect:    Appropriate    Thought Content:  Clear    Thought Form:  Coherent  Logical    Insight:    Good      Medication Review:   No changes to current psychiatric medication(s)     Medication Compliance:   Yes     Changes in Health Issues:   None reported     Chemical Use Review:   Substance Use: Chemical use reviewed, no active concerns identified      Tobacco Use: No change in amount of tobacco use since last session.  Patient declined discussion at this time     Collateral  Reports Completed:   Not Applicable    PLAN: (Client Tasks / Therapist Tasks / Other)  Client to read distress tolerance materials. Work on combating negative thoughts.         April Graff, [facility]                                                         ________________________________________________________________________    Treatment Plan    Client's Name: Ashutosh Velasco  YOB: 1980    Date: 1/12/18    DSM-V Diagnoses: 300.02 (F41.1) Generalized Anxiety Disorder  Psychosocial / Contextual Factors: Recently , moved to [place] from [place] in 2009, alcohol abuse  WHODAS: 20    Referral / Collaboration:  Referral to another professional/service is not indicated at this time..    Anticipated number of session or this episode of care: 5-15      MeasurableTreatment Goal(s) related to diagnosis / functional impairment(s)  Goal 1: Client will decrease acting on anxious urges from 10 out of 10 opportunities to at most 9 out of 10 opportunities for at least 3 consecutive weeks as evidenced by client report and KVNG scores.    Objective #A (Client Action)    Client will use relaxation strategies 2-5 times per day to reduce the physical symptoms of anxiety.  Status: New - Date: 1/12/18     Intervention(s)  Therapist will teach emotional recognition/identification. emotion regulation skills, distress tolerance skills, interpersonal effectiveness skills, mindfulness skills, CBT skills.    Objective #B  Client will decrease trichotillomania behaviors.  Status: New - Date: 1/12/18     Intervention(s)  Therapist will teach relaxation skills, distraction skills, behavior modification techniques, mindfulness skills.    Objective #C  Client will identify signs of anxiety, triggers of anxiety, decrease racing thoughts, be able to handle being at events where other people are drinking and not give in to urge to drink to decrease anxiety.  Status: New - Date: 1/12/18     Intervention(s)  Therapist will teach emotion  regulation skills, distress tolerance skills, mindfulness skills, interpersonal skills, CBT though changing skills.      Goal 2: Client will increase ability to communicate effectively with his wife from 3 out of 10 opportunities to at least 4 out of 10 opportunities for at elast 3 consecutive weeks as evidenced by client report and in session practice.     Objective #A (Client Action)    Status: New - Date: 1/12/18     Client will compile a list of boundaries that they would like to set with others. How to communicate wants and desires with wife.    Intervention(s)  Therapist will teach emotion regulation skills, distress tolerance skills, mindfulness skills, interpersonal skills, CBT though changing skills.    Objective #B  Client will practice setting boundaries 2 times in the next 1 weeks.    Status: New - Date: 1/12/18     Intervention(s)  Therapist will teach about healthy boundaries. emotion regulation skills, distress tolerance skills, mindfulness skills, interpersonal skills, CBT though changing skills.    Objective #C  Client will increase going out with friends/making friends.  Status: New - Date: 1/12/18     Intervention(s)  Therapist will teach emotion regulation skills, distress tolerance skills, mindfulness skills, interpersonal skills, CBT though changing skills.    Client has reviewed and agreed to the above plan.      April Graff, [facility]

## 2018-02-16 NOTE — MR AVS SNAPSHOT
MRN:[id number]                      After Visit Summary   2/16/2018    Ashutosh Velasco    MRN: [id number]           Visit Information        Provider Department      2/16/2018 2:00 PM April Graff [facility]      Your next 10 appointments already scheduled     Mar 09, 2018  2:00 PM CST   Return Visit with April Elizondo VIC Graff   [facility] ([facility])    [address]  [facility] [address]   [phone]            Mar 30, 2018  2:00 PM CDT   Return Visit with April Graff VIC   [facility] ([facility])    [address]  [facility] [address]   [phone]            Apr 13, 2018  2:00 PM CDT   Return Visit with April Graff VIC   [facility] ([facility])    [address]  [facility] [address]   [phone]              MyChart Information     [facility] gives you secure access to your electronic health record. If you see a primary care provider, you can also send messages to your care team and make appointments. If you have questions, please call your primary care clinic.  If you do not have a primary care provider, please call [phone] and they will assist you.        Care EveryWhere ID     This is your Care EveryWhere ID. This could be used by other organizations to access your [place] medical records  [id number]        Equal Access to Services     LAURITA REYNOSO : Hadii chuck ventura hadasho Soeliecerali, waaxda luqadaha, qaybta kaalmada adeegyakathleen, waxay mike ballesteros . So [facility] [phone].    ATENCIÓN: Si habla español, tiene a schreiber disposición servicios gratuitos de asistencia lingüística. Llame al [phone].    We comply with applicable federal civil rights laws and [place] laws. We do not discriminate on the basis of race, color, national origin, age, disability,  sex, sexual orientation, or gender identity.

## 2018-03-09 ENCOUNTER — OFFICE VISIT (OUTPATIENT)
Dept: PSYCHOLOGY | Facility: CLINIC | Age: 38
End: 2018-03-09
Payer: COMMERCIAL

## 2018-03-09 DIAGNOSIS — F41.1 GENERALIZED ANXIETY DISORDER: Primary | ICD-10-CM

## 2018-03-09 PROCEDURE — 90837 PSYTX W PT 60 MINUTES: CPT | Performed by: COUNSELOR

## 2018-03-09 ASSESSMENT — ANXIETY QUESTIONNAIRES
GAD7 TOTAL SCORE: 4
IF YOU CHECKED OFF ANY PROBLEMS ON THIS QUESTIONNAIRE, HOW DIFFICULT HAVE THESE PROBLEMS MADE IT FOR YOU TO DO YOUR WORK, TAKE CARE OF THINGS AT HOME, OR GET ALONG WITH OTHER PEOPLE: SOMEWHAT DIFFICULT
6. BECOMING EASILY ANNOYED OR IRRITABLE: NOT AT ALL
5. BEING SO RESTLESS THAT IT IS HARD TO SIT STILL: NOT AT ALL
7. FEELING AFRAID AS IF SOMETHING AWFUL MIGHT HAPPEN: NOT AT ALL
2. NOT BEING ABLE TO STOP OR CONTROL WORRYING: SEVERAL DAYS
1. FEELING NERVOUS, ANXIOUS, OR ON EDGE: SEVERAL DAYS
3. WORRYING TOO MUCH ABOUT DIFFERENT THINGS: SEVERAL DAYS

## 2018-03-09 ASSESSMENT — PATIENT HEALTH QUESTIONNAIRE - PHQ9: 5. POOR APPETITE OR OVEREATING: SEVERAL DAYS

## 2018-03-09 NOTE — PROGRESS NOTES
Progress Note    Client Name: Ashutosh Velasco  Date: 3/9/18         Service Type: Individual      Session Start Time: 2:00pm  Session End Time: 3:00pm      Session Length: 60 mins     Session #: 6     Attendees: Client attended alone    Treatment Plan Last Reviewed: (1/12/18)  PHQ-9 / KVNG-7 : See chart     DATA      Progress Since Last Session (Related to Symptoms / Goals / Homework):   Symptoms: Stable    Homework: Completed in session      Episode of Care Goals: Minimal progress - PREPARATION (Decided to change - considering how); Intervened by negotiating a change plan and determining options / strategies for behavior change, identifying triggers, exploring social supports, and working towards setting a date to begin behavior change     Current / Ongoing Stressors and Concerns:   Client stated he has been doing well. He had his vacation with his friend from home that went well. He stated his friend was supportive about him not drinking on the trip.  He also recently got back from a week long work trip that was very tiring. He said today he was very tired and wanted just to sleep. However, he decided to do the opposite and did some chores around the house. He said this helped him to get some energy and he felt better.  He said he has been working on controlling his thoughts and emotions better.  Especially when it comes to thinking he let someone down. He said he and his wife booked a trip to [place] at the end of April for their one year anniversary.      Treatment Objective(s) Addressed in This Session:   use thought-stopping strategy daily to reduce intrusive thoughts  compile a list of boundaries that they would like to set with others. With wife  identify two areas of life that you would like to have improved functioning       Intervention:   Motivational Interviewing: . Motivational Interviewing  Target Behavior: anxiety (ruminations and increase ability to go out and  do things) and communication skills    Stage of Change: PREPARATION (Decided to change - considering how)    MI Intervention: Expressed Empathy/Understanding, Supported Autonomy, Collaboration, Evocation, Permission to raise concern or advise, Open-ended questions, Reflections: simple and complex, Change talk (evoked) and Reframe     Change Talk Expressed by the Patient: Desire to change Ability to change Reasons to change Need to change Committment to change Activation Taking steps    Provider Response to Change Talk: E - Evoked more info from patient about behavior change, A - Affirmed patient's thoughts, decisions, or attempts at behavior change, R - Reflected patient's change talk and S - Summarized patient's change talk statements          ASSESSMENT: Current Emotional / Mental Status (status of significant symptoms):   Risk status (Self / Other harm or suicidal ideation)   Client denies current fears or concerns for personal safety.   Client denies current or recent suicidal ideation or behaviors.   Client denies current or recent homicidal ideation or behaviors.   Client denies current or recent self injurious behavior or ideation.   Client denies other safety concerns.   A safety and risk management plan has not been developed at this time, however client was given the after-hours number / 911 should there be a change in any of these risk factors.     Appearance:   Appropriate    Eye Contact:   Good    Psychomotor Behavior: Normal    Attitude:   Cooperative    Orientation:   All   Speech    Rate / Production: Normal     Volume:  Normal    Mood:    Anxious  Normal   Affect:    Appropriate    Thought Content:  Clear    Thought Form:  Coherent  Logical    Insight:    Good      Medication Review:   No changes to current psychiatric medication(s)     Medication Compliance:   Yes     Changes in Health Issues:   None reported     Chemical Use Review:   Substance Use: Chemical use reviewed, no active concerns  identified      Tobacco Use: No change in amount of tobacco use since last session.  Patient declined discussion at this time     Collateral Reports Completed:   Not Applicable    PLAN: (Client Tasks / Therapist Tasks / Other)   Work on combating negative thoughts. Interrupt his habits to help change them.         April Graff, [facility]                                                         ________________________________________________________________________    Treatment Plan    Client's Name: Ashutosh Velasco  YOB: 1980    Date: 1/12/18    DSM-V Diagnoses: 300.02 (F41.1) Generalized Anxiety Disorder  Psychosocial / Contextual Factors: Recently , moved to [place] from [place] in 2009, alcohol abuse  WHODAS: 20    Referral / Collaboration:  Referral to another professional/service is not indicated at this time..    Anticipated number of session or this episode of care: 5-15      MeasurableTreatment Goal(s) related to diagnosis / functional impairment(s)  Goal 1: Client will decrease acting on anxious urges from 10 out of 10 opportunities to at most 9 out of 10 opportunities for at least 3 consecutive weeks as evidenced by client report and KVNG scores.    Objective #A (Client Action)    Client will use relaxation strategies 2-5 times per day to reduce the physical symptoms of anxiety.  Status: New - Date: 1/12/18     Intervention(s)  Therapist will teach emotional recognition/identification. emotion regulation skills, distress tolerance skills, interpersonal effectiveness skills, mindfulness skills, CBT skills.    Objective #B  Client will decrease trichotillomania behaviors.  Status: New - Date: 1/12/18     Intervention(s)  Therapist will teach relaxation skills, distraction skills, behavior modification techniques, mindfulness skills.    Objective #C  Client will identify signs of anxiety, triggers of anxiety, decrease racing thoughts, be able to handle being at events where other people are drinking  and not give in to urge to drink to decrease anxiety.  Status: New - Date: 1/12/18     Intervention(s)  Therapist will teach emotion regulation skills, distress tolerance skills, mindfulness skills, interpersonal skills, CBT though changing skills.      Goal 2: Client will increase ability to communicate effectively with his wife from 3 out of 10 opportunities to at least 4 out of 10 opportunities for at elast 3 consecutive weeks as evidenced by client report and in session practice.     Objective #A (Client Action)    Status: New - Date: 1/12/18     Client will compile a list of boundaries that they would like to set with others. How to communicate wants and desires with wife.    Intervention(s)  Therapist will teach emotion regulation skills, distress tolerance skills, mindfulness skills, interpersonal skills, CBT though changing skills.    Objective #B  Client will practice setting boundaries 2 times in the next 1 weeks.    Status: New - Date: 1/12/18     Intervention(s)  Therapist will teach about healthy boundaries. emotion regulation skills, distress tolerance skills, mindfulness skills, interpersonal skills, CBT though changing skills.    Objective #C  Client will increase going out with friends/making friends.  Status: New - Date: 1/12/18     Intervention(s)  Therapist will teach emotion regulation skills, distress tolerance skills, mindfulness skills, interpersonal skills, CBT though changing skills.    Client has reviewed and agreed to the above plan.      April Graff, [facility]

## 2018-03-09 NOTE — MR AVS SNAPSHOT
MRN:[id number]                      After Visit Summary   3/9/2018    Ashutosh Velasco    MRN: [id number]           Visit Information        Provider Department      3/9/2018 2:00 PM [facility] Gra[facility] Generic      Your next 10 appointments already scheduled     Mar 30, 2018  2:00 PM CDT   Return Visit with April Elizondo VIC Graff   [facility] ([facility])    [address]  [facility] [address]   [phone]            Apr 13, 2018  2:00 PM CDT   Return Visit with April GraffVIC   [facility] ([facility])    [address]  [facility] [address]   [phone]            May 08, 2018  4:00 PM CDT   Return Visit with Aprilgigi Elizondo VIC Graff   [facility] ([facility])    [address]  [facility] [address]   [phone]              MyChart Information     [facility] gives you secure access to your electronic health record. If you see a primary care provider, you can also send messages to your care team and make appointments. If you have questions, please call your primary care clinic.  If you do not have a primary care provider, please call [phone] and they will assist you.        Care EveryWhere ID     This is your Care EveryWhere ID. This could be used by other organizations to access your [place] medical records  [id number]        Equal Access to Services     LAURITA REYNOSO : Hadii chuck duvalo Soeliecerali, waaxda luqadaha, qaybta kaalmada adeegyakathleen, waxay idimanpreet holt. So [facility] [phone].    ATENCIÓN: Si habla español, tiene a schreiber disposición servicios gratuitos de asistencia lingüística. Llame al [phone].    We comply with applicable federal civil rights laws and [place] laws. We do not discriminate on the basis of race, color, national origin, age, disability, sex,  sexual orientation, or gender identity.

## 2018-03-10 ASSESSMENT — ANXIETY QUESTIONNAIRES: GAD7 TOTAL SCORE: 4

## 2018-03-10 ASSESSMENT — PATIENT HEALTH QUESTIONNAIRE - PHQ9: SUM OF ALL RESPONSES TO PHQ QUESTIONS 1-9: 4

## 2018-03-26 ENCOUNTER — OFFICE VISIT (OUTPATIENT)
Dept: FAMILY MEDICINE | Facility: CLINIC | Age: 38
End: 2018-03-26
Payer: COMMERCIAL

## 2018-03-26 VITALS
HEIGHT: 68 IN | DIASTOLIC BLOOD PRESSURE: 82 MMHG | SYSTOLIC BLOOD PRESSURE: 118 MMHG | HEART RATE: 86 BPM | OXYGEN SATURATION: 98 % | BODY MASS INDEX: 23.99 KG/M2 | TEMPERATURE: 98.1 F | WEIGHT: 158.3 LBS | RESPIRATION RATE: 16 BRPM

## 2018-03-26 DIAGNOSIS — F41.1 GENERALIZED ANXIETY DISORDER: Primary | ICD-10-CM

## 2018-03-26 DIAGNOSIS — L98.9 SKIN LESION: ICD-10-CM

## 2018-03-26 PROCEDURE — 99214 OFFICE O/P EST MOD 30 MIN: CPT | Performed by: NURSE PRACTITIONER

## 2018-03-26 RX ORDER — CITALOPRAM HYDROBROMIDE 20 MG/1
20 TABLET ORAL DAILY
Qty: 90 TABLET | Refills: 1 | Status: SHIPPED | OUTPATIENT
Start: 2018-03-26 | End: 2018-10-01

## 2018-03-26 ASSESSMENT — ANXIETY QUESTIONNAIRES
1. FEELING NERVOUS, ANXIOUS, OR ON EDGE: SEVERAL DAYS
6. BECOMING EASILY ANNOYED OR IRRITABLE: NOT AT ALL
7. FEELING AFRAID AS IF SOMETHING AWFUL MIGHT HAPPEN: NOT AT ALL
GAD7 TOTAL SCORE: 3
GAD7 TOTAL SCORE: 3
4. TROUBLE RELAXING: SEVERAL DAYS
GAD7 TOTAL SCORE: 3
7. FEELING AFRAID AS IF SOMETHING AWFUL MIGHT HAPPEN: NOT AT ALL
5. BEING SO RESTLESS THAT IT IS HARD TO SIT STILL: NOT AT ALL
2. NOT BEING ABLE TO STOP OR CONTROL WORRYING: NOT AT ALL
3. WORRYING TOO MUCH ABOUT DIFFERENT THINGS: SEVERAL DAYS

## 2018-03-26 ASSESSMENT — PATIENT HEALTH QUESTIONNAIRE - PHQ9
SUM OF ALL RESPONSES TO PHQ QUESTIONS 1-9: 2
10. IF YOU CHECKED OFF ANY PROBLEMS, HOW DIFFICULT HAVE THESE PROBLEMS MADE IT FOR YOU TO DO YOUR WORK, TAKE CARE OF THINGS AT HOME, OR GET ALONG WITH OTHER PEOPLE: SOMEWHAT DIFFICULT
SUM OF ALL RESPONSES TO PHQ QUESTIONS 1-9: 2

## 2018-03-26 ASSESSMENT — PAIN SCALES - GENERAL: PAINLEVEL: NO PAIN (0)

## 2018-03-26 NOTE — MR AVS SNAPSHOT
After Visit Summary   3/26/2018    Ashutosh Velasco    MRN: [id number]           Patient Information     Date Of Birth          1980        Visit Information        Provider Department      3/26/2018 8:20 AM Sindy Patel APRN CNP [facility]        Today's Diagnoses     Generalized anxiety disorder    -  1    Skin lesion           Follow-ups after your visit        Your next 10 appointments already scheduled     Mar 30, 2018  2:00 PM CDT   Return Visit with April Graff LPC   [facility] ([facility])    [address]  [facility] [address]   [phone]            Apr 13, 2018  2:00 PM CDT   Return Visit with April Graff LPC   [facility] ([facility])    [address]  [facility] [address]   [phone]            May 08, 2018  4:00 PM CDT   Return Visit with April Graff LPC   [facility] ([facility])    [address]  [facility] [address]   [phone]              Who to contact     If you have questions or need follow up information about today's clinic visit or your schedule please contact [facility] directly at [phone].  Normal or non-critical lab and imaging results will be communicated to you by MyChart, letter or phone within 4 business days after the clinic has received the results. If you do not hear from us within 7 days, please contact the clinic through MyChart or phone. If you have a critical or abnormal lab result, we will notify you by phone as soon as possible.  Submit refill requests through [employer] or call your pharmacy and they will forward the refill request to us. Please allow 3 business days for your refill to be completed.          Additional Information About Your Visit        [employer] Information     [employer] gives you secure access to your electronic health record. If you see a  "primary care provider, you can also send messages to your care team and make appointments. If you have questions, please call your primary care clinic.  If you do not have a primary care provider, please call [phone] and they will assist you.        Care EveryWhere ID     This is your Care EveryWhere ID. This could be used by other organizations to access your [place] medical records  [id number]        Your Vitals Were     Pulse Temperature Respirations Height Pulse Oximetry BMI (Body Mass Index)    86 98.1  F (36.7  C) (Temporal) 16 5' 8.11\" (1.73 m) 98% 23.99 kg/m2       Blood Pressure from Last 3 Encounters:   03/26/18 118/82   04/05/17 144/80   12/13/16 120/70    Weight from Last 3 Encounters:   03/26/18 158 lb 4.8 oz (71.8 kg)   04/05/17 143 lb 3.2 oz (65 kg)   12/13/16 151 lb 4.8 oz (68.6 kg)              Today, you had the following     No orders found for display         Where to get your medicines      These medications were sent to [id number] - [place], [place] - [address]  [address], [facility] [address]    Hours:  test Rx sent successfully 12/26/02  KR Phone:  [phone]     citalopram 20 MG tablet          Primary Care Provider Office Phone # Fax #    Neal Molina [phone]       [address] [place] [phone]        Equal Access to Services     LAURITA REYNOSO AH: Hadii chuck ku hadasho Soeliecerali, waaxda luqadaha, qaybta kaalmada adeegortiz, oanh holt. So [facility] [phone].    ATENCIÓN: Si habla español, tiene a schreiber disposición servicios gratuitos de asistencia lingüística. Llame al [phone].    We comply with applicable federal civil rights laws and [place] laws. We do not discriminate on the basis of race, color, national origin, age, disability, sex, sexual orientation, or gender identity.            Thank you!     [facility] your [facility]. Our goal is always to " provide you with excellent care. Hearing back from our patients is one way we can continue to improve our services. Please take a few minutes to complete the written survey that you may receive in the mail after your visit with us. Thank you!             Your Updated Medication List - Protect others around you: Learn how to safely use, store and throw away your medicines at www.disposemymeds.org.          This list is accurate as of 3/26/18  5:56 PM.  Always use your most recent med list.                   Brand Name Dispense Instructions for use Diagnosis    citalopram 20 MG tablet    celeXA    90 tablet    Take 1 tablet (20 mg) by mouth daily    Generalized anxiety disorder       LORazepam 0.5 MG tablet    ATIVAN    10 tablet    Take 1 tablet (0.5 mg) by mouth every 8 hours as needed for other (panic attacks)    Generalized anxiety disorder       MULTIVITAMINS PO      Take 1 tablet by mouth daily.

## 2018-03-26 NOTE — PROGRESS NOTES
SUBJECTIVE:   Ashutosh Velasco is a 37 year old male who presents to clinic today for the following health issues:      History of Present Illness     Depression & Anxiety Follow-up:     Depression/Anxiety:  Anxiety only    Status since last visit::  Improved    Other associated symptoms of anxiety::  None    Significant life event::  No    Current substance use::  None    Depression symptoms::  None       Today's PHQ-9         PHQ-9 Total Score:     (P) 2   PHQ-9 Q9 Suicidal ideation:   (P) Not at all   Thoughts of suicide or self harm:      Self-harm Plan:        Self-harm Action:          Safety concerns for self or others:       KVNG-7 Total Score: (P) 3    Diet:  Regular (no restrictions)  Frequency of exercise:  4-5 days/week  Duration of exercise:  30-45 minutes  Taking medications regularly:  Yes  Medication side effects:  None  Additional concerns today:  No    Patient presents today for a refill of his citalopram medication. He does not have any medication concerns. He deniHe has not felt a panic attack within the past 6 months. He is currently in counseling to help with worries. He still has worries, but they are controlled. He is  and denies low libido. Sobriety is going well, he denies relapses.      Itchy spot of R posterior forearm and is scabbing over the mole. Wife with malignant melanoma and has had 2 removals in the past.      Problem list and histories reviewed & adjusted, as indicated.  Additional history: as documented    Patient Active Problem List   Diagnosis     Generalized anxiety disorder     CARDIOVASCULAR SCREENING; LDL GOAL LESS THAN 160     Alcohol abuse     Past Surgical History:   Procedure Laterality Date     APPENDECTOMY  1997     EYE SURGERY  5 years old    strabismus     HERNIORRHAPHY INGUINAL  11/8/2011    Procedure:HERNIORRHAPHY INGUINAL; OPEN RIGHT INGUINAL HERNIA REPAIR; Surgeon:NILDA VANCE; Location:[facility]       Social History   Substance Use Topics     Smoking  status: Former Smoker     Packs/day: 1.00     Years: 10.00     Types: Dip, chew, snus or snuff     Smokeless tobacco: Current User     Types: Chew      Comment: 1 tin a day     Alcohol use No      Comment: quit in November 2017     Family History   Problem Relation Age of Onset     Hypertension Father      GASTROINTESTINAL DISEASE Father      chronic diarrhea     Coronary Artery Disease Paternal Grandfather      CEREBROVASCULAR DISEASE Maternal Grandfather      Anxiety Disorder Sister      Thyroid Disease Mother      hyperthyroidism         Current Outpatient Prescriptions   Medication Sig Dispense Refill     citalopram (CELEXA) 20 MG tablet Take 1 tablet (20 mg) by mouth daily 90 tablet 1     LORazepam (ATIVAN) 0.5 MG tablet Take 1 tablet (0.5 mg) by mouth every 8 hours as needed for other (panic attacks) 10 tablet 0     [DISCONTINUED] citalopram (CELEXA) 20 MG tablet Take 1 tablet (20 mg) by mouth daily 30 tablet 0     Multiple Vitamin (MULTIVITAMINS PO) Take 1 tablet by mouth daily.       No Known Allergies  Recent Labs   Lab Test  12/13/16   1241  08/17/11   1633  08/13/11   0300   LDL  124*   --    --    HDL  62  44   --    TRIG  93   --    --    ALT  27   --   12   CR  1.18  1.04  1.06   GFRESTIMATED  70  84  83   GFRESTBLACK  85  >90  >90   POTASSIUM  4.2  4.1  3.3*   TSH  1.84  2.53  7.21*      BP Readings from Last 3 Encounters:   03/26/18 118/82   04/05/17 144/80   12/13/16 120/70    Wt Readings from Last 3 Encounters:   03/26/18 158 lb 4.8 oz (71.8 kg)   04/05/17 143 lb 3.2 oz (65 kg)   12/13/16 151 lb 4.8 oz (68.6 kg)           ROS:  Constitutional, HEENT, cardiovascular, pulmonary, GI, , musculoskeletal, neuro, skin, endocrine and psych systems are negative, except as otherwise noted.    This document serves as a record of the services and decisions personally performed and made by Sindy Patel CNP. It was created on her behalf by Katina Sanchez, a trained medical scribe. The creation of this document is  "based the provider's statements to the medical scribe.    Katina Sanchez March 26, 2018 8:52 AM    OBJECTIVE:   /82  Pulse 86  Temp 98.1  F (36.7  C) (Temporal)  Resp 16  Ht 5' 8.11\" (1.73 m)  Wt 158 lb 4.8 oz (71.8 kg)  SpO2 98%  BMI 23.99 kg/m2  Body mass index is 23.99 kg/(m^2).  GENERAL: healthy, alert and no distress  SKIN: On the R posterior forearm, there is a 3mm freckling, light brown lesion with mild dryness. No erythema and is non-tender  NEURO: Normal strength and tone, mentation intact and speech normal  PSYCH: mentation appears normal, affect normal/bright    Diagnostic Test Results:  none     ASSESSMENT/PLAN:       ICD-10-CM    1. Generalized anxiety disorder F41.1 citalopram (CELEXA) 20 MG tablet   2. Skin lesion L98.9    Reviewed KVNG and medication. Doing well and mood is stable overall. Tolerating medication well and denies side effects. Discussed continuing medication and continue counseling. Refills completed today.     Continue to monitor skin lesion. See skin exam above. Should improve overtime. If there is change in size, appearance, or is not improving then will consider skin biopsy removal. Should also consider starting on vit. D supplement.      Follow-up in 6 months for mood and medication recheck.    The information in this document, created by the medical scribe for me, accurately reflects the services I personally performed and the decisions made by me. I have reviewed and approved this document for accuracy prior to leaving the patient care area.    JOSEPH Okeefe [facility] KEISHA  Answers for HPI/ROS submitted by the patient on 3/26/2018   PHQ-2 Score: 0  KVNG 7 TOTAL SCORE: 3  If you checked off any problems, how difficult have these problems made it for you to do your work, take care of things at home, or get along with other people?: Somewhat difficult  PHQ9 TOTAL SCORE: 2    "

## 2018-03-27 ASSESSMENT — ANXIETY QUESTIONNAIRES: GAD7 TOTAL SCORE: 3

## 2018-03-27 ASSESSMENT — PATIENT HEALTH QUESTIONNAIRE - PHQ9: SUM OF ALL RESPONSES TO PHQ QUESTIONS 1-9: 2

## 2018-03-30 ENCOUNTER — OFFICE VISIT (OUTPATIENT)
Dept: PSYCHOLOGY | Facility: CLINIC | Age: 38
End: 2018-03-30
Payer: COMMERCIAL

## 2018-03-30 DIAGNOSIS — F41.1 GENERALIZED ANXIETY DISORDER: Primary | ICD-10-CM

## 2018-03-30 PROCEDURE — 90834 PSYTX W PT 45 MINUTES: CPT | Performed by: COUNSELOR

## 2018-03-30 NOTE — PROGRESS NOTES
Progress Note    Client Name: Ashutosh Velasco  Date: 3/30/18         Service Type: Individual      Session Start Time: 2:00pm  Session End Time: 2:47pm      Session Length: 47 mins     Session #: 7     Attendees: Client attended alone    Treatment Plan Last Reviewed: 3/30/18  PHQ-9 / KVNG-7 : See chart     DATA      Progress Since Last Session (Related to Symptoms / Goals / Homework):   Symptoms: Stable    Homework: Completed in session      Episode of Care Goals: Satisfactory progress - MAINTENANCE (Working to maintain change, with risk of relapse); Intervened by continuing to positively reinforce healthy behavior choice      Current / Ongoing Stressors and Concerns:   Client stated he has been doing well. He stated he and his wife have really made a lot of changes to their diets, exercise, and how they are interacting. He feels like he is doing really well and hasn't experienced much anxiety that has been really distressing. He stated he's been able to go for about a 3 mile walk with his dogs everyday. He has been getting better sleep too this past week because they dropped their puppy off at her training school where she will be for 3 months. He believes this has helped his mood quite a bit. He stated he and his wife are going to her family's in [place] for [facility]. This will be stressful for them but they will find ways to cope ahead for it.      Treatment Objective(s) Addressed in This Session:   use thought-stopping strategy daily to reduce intrusive thoughts  compile a list of boundaries that they would like to set with others. With wife  identify two areas of life that you would like to have improved functioning       Intervention:   Motivational Interviewing: . Motivational Interviewing  Target Behavior: anxiety (ruminations and increase ability to go out and do things) and communication skills    Stage of Change: PREPARATION (Decided to change - considering how)    MI  Intervention: Expressed Empathy/Understanding, Supported Autonomy, Collaboration, Evocation, Permission to raise concern or advise, Open-ended questions, Reflections: simple and complex, Change talk (evoked) and Reframe     Change Talk Expressed by the Patient: Desire to change Ability to change Reasons to change Need to change Committment to change Activation Taking steps    Provider Response to Change Talk: E - Evoked more info from patient about behavior change, A - Affirmed patient's thoughts, decisions, or attempts at behavior change, R - Reflected patient's change talk and S - Summarized patient's change talk statements          ASSESSMENT: Current Emotional / Mental Status (status of significant symptoms):   Risk status (Self / Other harm or suicidal ideation)   Client denies current fears or concerns for personal safety.   Client denies current or recent suicidal ideation or behaviors.   Client denies current or recent homicidal ideation or behaviors.   Client denies current or recent self injurious behavior or ideation.   Client denies other safety concerns.   A safety and risk management plan has not been developed at this time, however client was given the after-hours number / 911 should there be a change in any of these risk factors.     Appearance:   Appropriate    Eye Contact:   Good    Psychomotor Behavior: Normal    Attitude:   Cooperative    Orientation:   All   Speech    Rate / Production: Normal     Volume:  Normal    Mood:    Anxious  Normal   Affect:    Appropriate    Thought Content:  Clear    Thought Form:  Coherent  Logical    Insight:    Good      Medication Review:   No changes to current psychiatric medication(s)     Medication Compliance:   Yes     Changes in Health Issues:   None reported     Chemical Use Review:   Substance Use: Chemical use reviewed, no active concerns identified      Tobacco Use: No change in amount of tobacco use since last session.  Patient declined discussion at  this time     Collateral Reports Completed:   Not Applicable    PLAN: (Client Tasks / Therapist Tasks / Other)   Work on combating negative thoughts. [place] ahead for stressful situations.        April Caroregan Graff, [facility]                                                         ________________________________________________________________________    Treatment Plan    Client's Name: Ashutosh Velasco  YOB: 1980    Date: 3/30/18    DSM-V Diagnoses: 300.02 (F41.1) Generalized Anxiety Disorder  Psychosocial / Contextual Factors: Recently , moved to [place] from [place] in 2009, alcohol abuse  WHODAS: 18    Referral / Collaboration:  Referral to another professional/service is not indicated at this time..    Anticipated number of session or this episode of care: 5-15      MeasurableTreatment Goal(s) related to diagnosis / functional impairment(s)  Goal 1: Client will decrease acting on anxious urges from 3 out of 10 opportunities to at most 2 out of 10 opportunities for at least 3 consecutive weeks as evidenced by client report and KVNG scores.      Objective #A (Client Action)    Client will use relaxation strategies 2-5 times per day to reduce the physical symptoms of anxiety.  Status: Continued - Date(s): 3/30/18      Intervention(s)  Therapist will teach emotional recognition/identification. emotion regulation skills, distress tolerance skills, interpersonal effectiveness skills, mindfulness skills, CBT skills.    Objective #B  Client will decrease trichotillomania behaviors.  Status: Continued - Date(s): 3/30/18      Intervention(s)  Therapist will teach relaxation skills, distraction skills, behavior modification techniques, mindfulness skills.    Objective #C  Client will identify signs of anxiety, triggers of anxiety, decrease racing thoughts, be able to handle being at events where other people are drinking and not give in to urge to drink to decrease anxiety.  Status: Continued - Date(s): 3/30/18       Intervention(s)  Therapist will teach emotion regulation skills, distress tolerance skills, mindfulness skills, interpersonal skills, CBT though changing skills.      Goal 2: Client will increase ability to communicate effectively with his wife from 7 out of 10 opportunities to at least 8 out of 10 opportunities for at elast 3 consecutive weeks as evidenced by client report and in session practice.     Objective #A (Client Action)    Status: Continued - Date(s):3/30/18     Client will compile a list of boundaries that they would like to set with others. How to communicate wants and desires with wife.    Intervention(s)  Therapist will teach emotion regulation skills, distress tolerance skills, mindfulness skills, interpersonal skills, CBT though changing skills.    Objective #B  Client will practice setting boundaries 2 times in the next 1 weeks.    Status: Continued - Date(s): 3/30/18      Intervention(s)  Therapist will teach about healthy boundaries. emotion regulation skills, distress tolerance skills, mindfulness skills, interpersonal skills, CBT though changing skills.    Objective #C  Client will increase going out with friends/making friends.  Status: Continued - Date(s): 3/30/18      Intervention(s)  Therapist will teach emotion regulation skills, distress tolerance skills, mindfulness skills, interpersonal skills, CBT though changing skills.    Client has reviewed and agreed to the above plan.      April Graff, [facility]

## 2018-03-30 NOTE — MR AVS SNAPSHOT
MRN:[id number]                      After Visit Summary   3/30/2018    Ashutosh Velasco    MRN: [id number]           Visit Information        Provider Department      3/30/2018 2:00 PM April Graff [facility]      Your next 10 appointments already scheduled     May 08, 2018  4:00 PM CDT   Return Visit with Aprilgigi Elizondo VIC Graff   [facility] ([facility])    [address]  [facility] [id number] [phone]              MyChart Information     [employer] gives you secure access to your electronic health record. If you see a primary care provider, you can also send messages to your care team and make appointments. If you have questions, please call your primary care clinic.  If you do not have a primary care provider, please call [phone] and they will assist you.        Care EveryWhere ID     This is your Care EveryWhere ID. This could be used by other organizations to access your [place] medical records  [id number]        Equal Access to Services     LAURITA REYNOSO AH: Hadii aad ku hadasho Soángela, waaxda luqadaha, qaybta kaalmada adeegyada, oanh holt. So [facility] [phone].    ATENCIÓN: Si habla español, tiene a schreiber disposición servicios gratuitos de asistencia lingüística. Llame al [phone].    We comply with applicable federal civil rights laws and [place] laws. We do not discriminate on the basis of race, color, national origin, age, disability, sex, sexual orientation, or gender identity.

## 2018-05-08 ENCOUNTER — OFFICE VISIT (OUTPATIENT)
Dept: PSYCHOLOGY | Facility: CLINIC | Age: 38
End: 2018-05-08
Payer: COMMERCIAL

## 2018-05-08 DIAGNOSIS — F41.1 GENERALIZED ANXIETY DISORDER: Primary | ICD-10-CM

## 2018-05-08 PROCEDURE — 90834 PSYTX W PT 45 MINUTES: CPT | Performed by: COUNSELOR

## 2018-05-08 ASSESSMENT — ANXIETY QUESTIONNAIRES
2. NOT BEING ABLE TO STOP OR CONTROL WORRYING: NOT AT ALL
4. TROUBLE RELAXING: NOT AT ALL
6. BECOMING EASILY ANNOYED OR IRRITABLE: NOT AT ALL
7. FEELING AFRAID AS IF SOMETHING AWFUL MIGHT HAPPEN: NOT AT ALL
GAD7 TOTAL SCORE: 2
GAD7 TOTAL SCORE: 2
7. FEELING AFRAID AS IF SOMETHING AWFUL MIGHT HAPPEN: NOT AT ALL
GAD7 TOTAL SCORE: 2
1. FEELING NERVOUS, ANXIOUS, OR ON EDGE: SEVERAL DAYS
5. BEING SO RESTLESS THAT IT IS HARD TO SIT STILL: NOT AT ALL
3. WORRYING TOO MUCH ABOUT DIFFERENT THINGS: SEVERAL DAYS

## 2018-05-08 ASSESSMENT — PATIENT HEALTH QUESTIONNAIRE - PHQ9
SUM OF ALL RESPONSES TO PHQ QUESTIONS 1-9: 1
10. IF YOU CHECKED OFF ANY PROBLEMS, HOW DIFFICULT HAVE THESE PROBLEMS MADE IT FOR YOU TO DO YOUR WORK, TAKE CARE OF THINGS AT HOME, OR GET ALONG WITH OTHER PEOPLE: NOT DIFFICULT AT ALL
SUM OF ALL RESPONSES TO PHQ QUESTIONS 1-9: 1

## 2018-05-08 NOTE — MR AVS SNAPSHOT
MRN:[id number]                      After Visit Summary   5/8/2018    Ashutosh Velasco    MRN: [id number]           Visit Information        Provider Department      5/8/2018 4:00 PM April Graff [facility]      MyChart Information     MyChart gives you secure access to your electronic health record. If you see a primary care provider, you can also send messages to your care team and make appointments. If you have questions, please call your primary care clinic.  If you do not have a primary care provider, please call [phone] and they will assist you.        Care EveryWhere ID     This is your Care EveryWhere ID. This could be used by other organizations to access your [place] medical records  [id number]        Equal Access to Services     LAURITA REYNOSO : Caroline Mcdonald, waaxda luqadaha, qaybta kaalmada aderonal, oanh holt. So [facility] [phone].    ATENCIÓN: Si habla español, tiene a schrebier disposición servicios gratuitos de asistencia lingüística. Llame al [phone].    We comply with applicable federal civil rights laws and [place] laws. We do not discriminate on the basis of race, color, national origin, age, disability, sex, sexual orientation, or gender identity.

## 2018-05-09 ASSESSMENT — ANXIETY QUESTIONNAIRES: GAD7 TOTAL SCORE: 2

## 2018-05-09 ASSESSMENT — PATIENT HEALTH QUESTIONNAIRE - PHQ9: SUM OF ALL RESPONSES TO PHQ QUESTIONS 1-9: 1

## 2018-05-11 NOTE — PROGRESS NOTES
Progress Note    Client Name: Ashutosh Velasco  Date: 5/8/18         Service Type: Individual      Session Start Time: 4:00pm  Session End Time: 4:45pm      Session Length: 45 mins     Session #: 8     Attendees: Client attended alone    Treatment Plan Last Reviewed: 3/30/18  PHQ-9 / KVNG-7 : See chart     DATA      Progress Since Last Session (Related to Symptoms / Goals / Homework):   Symptoms: Stable    Homework: Completed in session      Episode of Care Goals: Satisfactory progress - MAINTENANCE (Working to maintain change, with risk of relapse); Intervened by continuing to positively reinforce healthy behavior choice      Current / Ongoing Stressors and Concerns:   Client stated he has been doing well. He stated his vacation with his wife went very well. He stated he even had a few drinks on the trip and it was fine. He said he and his wife talked about him having a drink in depth and made the decision together.  He stated he feels confident that he can manage his alcohol intake and will be able to recognize any warning signs if he's having increased stressed that is leading him to escape through drinking. He stated he feels much more capable of dealing with his issues. He agreed that he will come back if he needs anything in the future.     Treatment Objective(s) Addressed in This Session:   use thought-stopping strategy daily to reduce intrusive thoughts  compile a list of boundaries that they would like to set with others. With wife  identify two areas of life that you would like to have improved functioning       Intervention:   Motivational Interviewing: . Motivational Interviewing  Target Behavior: anxiety (ruminations and increase ability to go out and do things) and communication skills    Stage of Change: PREPARATION (Decided to change - considering how)    MI Intervention: Expressed Empathy/Understanding, Supported Autonomy, Collaboration, Evocation, Permission  to raise concern or advise, Open-ended questions, Reflections: simple and complex, Change talk (evoked) and Reframe     Change Talk Expressed by the Patient: Desire to change Ability to change Reasons to change Need to change Committment to change Activation Taking steps    Provider Response to Change Talk: E - Evoked more info from patient about behavior change, A - Affirmed patient's thoughts, decisions, or attempts at behavior change, R - Reflected patient's change talk and S - Summarized patient's change talk statements          ASSESSMENT: Current Emotional / Mental Status (status of significant symptoms):   Risk status (Self / Other harm or suicidal ideation)   Client denies current fears or concerns for personal safety.   Client denies current or recent suicidal ideation or behaviors.   Client denies current or recent homicidal ideation or behaviors.   Client denies current or recent self injurious behavior or ideation.   Client denies other safety concerns.   A safety and risk management plan has not been developed at this time, however client was given the after-hours number / 911 should there be a change in any of these risk factors.     Appearance:   Appropriate    Eye Contact:   Good    Psychomotor Behavior: Normal    Attitude:   Cooperative    Orientation:   All   Speech    Rate / Production: Normal     Volume:  Normal    Mood:    Anxious  Normal   Affect:    Appropriate    Thought Content:  Clear    Thought Form:  Coherent  Logical    Insight:    Good      Medication Review:   No changes to current psychiatric medication(s)     Medication Compliance:   Yes     Changes in Health Issues:   None reported     Chemical Use Review:   Substance Use: Chemical use reviewed, no active concerns identified      Tobacco Use: No change in amount of tobacco use since last session.  Patient declined discussion at this time     Collateral Reports Completed:   Not Applicable    PLAN: (Client Tasks / Therapist Tasks /  Other)   Client can return if needed        April Graff, [facility]                                                         ________________________________________________________________________    Treatment Plan    Client's Name: Ashutosh Velasco  YOB: 1980    Date: 3/30/18    DSM-V Diagnoses: 300.02 (F41.1) Generalized Anxiety Disorder  Psychosocial / Contextual Factors: Recently , moved to [place] from [place] in 2009, alcohol abuse  WHODAS: 18    Referral / Collaboration:  Referral to another professional/service is not indicated at this time..    Anticipated number of session or this episode of care: 5-15      MeasurableTreatment Goal(s) related to diagnosis / functional impairment(s)  Goal 1: Client will decrease acting on anxious urges from 3 out of 10 opportunities to at most 2 out of 10 opportunities for at least 3 consecutive weeks as evidenced by client report and KVNG scores.      Objective #A (Client Action)    Client will use relaxation strategies 2-5 times per day to reduce the physical symptoms of anxiety.  Status: Continued - Date(s): 3/30/18      Intervention(s)  Therapist will teach emotional recognition/identification. emotion regulation skills, distress tolerance skills, interpersonal effectiveness skills, mindfulness skills, CBT skills.    Objective #B  Client will decrease trichotillomania behaviors.  Status: Continued - Date(s): 3/30/18      Intervention(s)  Therapist will teach relaxation skills, distraction skills, behavior modification techniques, mindfulness skills.    Objective #C  Client will identify signs of anxiety, triggers of anxiety, decrease racing thoughts, be able to handle being at events where other people are drinking and not give in to urge to drink to decrease anxiety.  Status: Continued - Date(s): 3/30/18      Intervention(s)  Therapist will teach emotion regulation skills, distress tolerance skills, mindfulness skills, interpersonal skills, CBT though changing  skills.      Goal 2: Client will increase ability to communicate effectively with his wife from 7 out of 10 opportunities to at least 8 out of 10 opportunities for at elast 3 consecutive weeks as evidenced by client report and in session practice.     Objective #A (Client Action)    Status: Continued - Date(s):3/30/18     Client will compile a list of boundaries that they would like to set with others. How to communicate wants and desires with wife.    Intervention(s)  Therapist will teach emotion regulation skills, distress tolerance skills, mindfulness skills, interpersonal skills, CBT though changing skills.    Objective #B  Client will practice setting boundaries 2 times in the next 1 weeks.    Status: Continued - Date(s): 3/30/18      Intervention(s)  Therapist will teach about healthy boundaries. emotion regulation skills, distress tolerance skills, mindfulness skills, interpersonal skills, CBT though changing skills.    Objective #C  Client will increase going out with friends/making friends.  Status: Continued - Date(s): 3/30/18      Intervention(s)  Therapist will teach emotion regulation skills, distress tolerance skills, mindfulness skills, interpersonal skills, CBT though changing skills.    Client has reviewed and agreed to the above plan.      April Graff [facility]  Answers for HPI/ROS submitted by the patient on 5/8/2018   If you checked off any problems, how difficult have these problems made it for you to do your work, take care of things at home, or get along with other people?: Not difficult at all  PHQ9 TOTAL SCORE: 1  KVNG 7 TOTAL SCORE: 2

## 2018-10-01 ENCOUNTER — MYC REFILL (OUTPATIENT)
Dept: FAMILY MEDICINE | Facility: CLINIC | Age: 38
End: 2018-10-01

## 2018-10-01 DIAGNOSIS — F41.1 GENERALIZED ANXIETY DISORDER: ICD-10-CM

## 2018-10-01 NOTE — TELEPHONE ENCOUNTER
Message from LOAGt:  Original authorizing provider: JOSEPH Okeefe CNP    Ashutosh Velasco would like a refill of the following medications:  citalopram (CELEXA) 20 MG tablet [JOSEPH Okeefe CNP]    Preferred pharmacy: [id number] [address]    Comment:  I have run out of refills and am requesting a refill until I can come in for an appointment.

## 2018-10-02 RX ORDER — CITALOPRAM HYDROBROMIDE 20 MG/1
20 TABLET ORAL DAILY
Qty: 30 TABLET | Refills: 0 | Status: SHIPPED | OUTPATIENT
Start: 2018-10-02 | End: 2018-10-17

## 2018-10-02 NOTE — TELEPHONE ENCOUNTER
Citalopram    Medication is being filled for 1 time refill only due to:  Patient needs to be seen because 6 month mood F/U.     Please help schedule    Rupa Mcdaniel RN, BSN

## 2018-10-16 NOTE — PROGRESS NOTES
SUBJECTIVE:   Ashutosh Velasco is a 38 year old male who presents to clinic today for the following health issues:      History of Present Illness     Depression & Anxiety Follow-up:     Depression/Anxiety:  Anxiety only    Status since last visit::  Stable    Other associated symptoms of anxiety::  YES    Significant life event::  No    Current substance use::  None and Prescription Drugs    Depression symptoms::  None       Today's PHQ-9         PHQ-9 Total Score:     (P) 3   PHQ-9 Q9 Suicidal ideation:   (P) Not at all   Thoughts of suicide or self harm:      Self-harm Plan:        Self-harm Action:          Safety concerns for self or others:       KVNG-7 Total Score: (P) 5    Diet:  Regular (no restrictions)  Frequency of exercise:  2-3 days/week  Duration of exercise:  45-60 minutes  Taking medications regularly:  Yes  Medication side effects:  None  Additional concerns today:  No    Anxiety Follow-Up    Status since last visit: Essentially resolved with current medications which are well-tolerated.    Other associated symptoms: No other symptoms noted.  No depression noted.  Has discontinued use of alcohol as a controlling mechanism for anxiety.  Has not required Lorazepam.    Complicating factors:   Significant life event: No   Current substance abuse: Clear of not using alcohol for the last year.  Depression symptoms: No  KVNG-7 SCORE 3/26/2018 5/8/2018 10/17/2018   Total Score - - -   Total Score 3 (minimal anxiety) 2 (minimal anxiety) 5 (mild anxiety)   Total Score 3 2 5       KVNG-7  Problem list and histories reviewed & adjusted, as indicated.  Additional history: as documented        Current Outpatient Prescriptions   Medication Sig Dispense Refill     citalopram (CELEXA) 20 MG tablet Take 1 tablet (20 mg) by mouth daily 90 tablet 3     LORazepam (ATIVAN) 0.5 MG tablet Take 1 tablet (0.5 mg) by mouth every 8 hours as needed for other (panic attacks) 10 tablet 0     Multiple Vitamin (MULTIVITAMINS PO) Take 1  "tablet by mouth daily.       [DISCONTINUED] citalopram (CELEXA) 20 MG tablet Take 1 tablet (20 mg) by mouth daily 30 tablet 0     BP Readings from Last 3 Encounters:   10/17/18 130/80   03/26/18 118/82   04/05/17 144/80    Wt Readings from Last 3 Encounters:   10/17/18 167 lb (75.8 kg)   03/26/18 158 lb 4.8 oz (71.8 kg)   04/05/17 143 lb 3.2 oz (65 kg)                    ROS:  CONSTITUTIONAL: NEGATIVE for fever, chills, change in weight  INTEGUMENTARY/SKIN: NEGATIVE for worrisome rashes, moles or lesions  EYES: NEGATIVE for vision changes or irritation  ENT/MOUTH: NEGATIVE for ear, mouth and throat problems  ENT/MOUTH: Uses chewing tobacco/smokeless tobacco at least 4 times daily.  RESP: NEGATIVE for significant cough or SOB  CV: NEGATIVE for chest pain, palpitations or peripheral edema  GI: NEGATIVE for nausea, abdominal pain, heartburn, or change in bowel habits  : NEGATIVE for frequency, dysuria, or hematuria  MUSCULOSKELETAL: NEGATIVE for significant arthralgias or myalgia  NEURO: NEGATIVE for weakness, dizziness or paresthesias  ENDOCRINE: NEGATIVE for temperature intolerance, skin/hair changes  HEME: NEGATIVE for bleeding problems  PSYCHIATRIC: NEGATIVE for changes in mood or affect  PSYCHIATRIC: Well-controlled anxiety.  Patient is comfortable at his current dose of Celexa.    OBJECTIVE:                                                    /80  Pulse 83  Temp 98.4  F (36.9  C) (Oral)  Ht 5' 7\" (1.702 m)  Wt 167 lb (75.8 kg)  SpO2 97%  BMI 26.16 kg/m2  Body mass index is 26.16 kg/(m^2).  GENERAL: healthy, alert and no distress  EYES: Eyes grossly normal to inspection, extraocular movements - intact, and PERRL  HENT: ear canals- normal; TMs- normal; Nose- normal; Mouth- no ulcers, no lesions  HENT: Slight change in buccal mucosa on the left lower mucosal region.  Otherwise clear.  NECK: no tenderness, no adenopathy, no asymmetry, no masses, no stiffness; thyroid- normal to palpation  RESP: lungs " clear to auscultation - no rales, no rhonchi, no wheezes  CV: regular rates and rhythm, normal S1 S2, no S3 or S4 and no murmur, no click or rub -  ABDOMEN: soft, no tenderness, no  hepatosplenomegaly, no masses, normal bowel sounds  MS: extremities- no gross deformities noted, no edema  SKIN: no suspicious lesions, no rashes  NEURO: strength and tone- normal, sensory exam- grossly normal, mentation- intact, speech- normal, reflexes- symmetric  BACK: no CVA tenderness, no paralumbar tenderness  PSYCH: Alert and oriented times 3; speech- coherent , normal rate and volume; able to articulate logical thoughts, able to abstract reason, no tangential thoughts, no hallucinations or delusions, affect- normal  LYMPHATICS: ant. cervical- normal, post. cervical- normal, axillary- normal, supraclavicular- normal, inguinal- normal    Diagnostic test results:  Diagnostic Test Results:  No results found for this or any previous visit (from the past 24 hour(s)).     ASSESSMENT/PLAN:                                                    1. Generalized anxiety disorder  Appears to be well controlled on current dose of Celexa.  This is renewed for 1 year.  Since we are dealing only with anxiety, follow-up in 1 year.  - citalopram (CELEXA) 20 MG tablet; Take 1 tablet (20 mg) by mouth daily  Dispense: 90 tablet; Refill: 3    2. Need for prophylactic vaccination and inoculation against influenza  Vaccination given today.  - FLU VACCINE, SPLIT VIRUS, IM (QUADRIVALENT) [62673]- >3 YRS  - Vaccine Administration, Initial [53714]    3. Tobacco use  Strongly encourage discontinuation of any tobacco use.  To use nicotine replacement therapy as needed  - TOBACCO CESSATION - FOR HEALTH MAINTENANCE    4. CARDIOVASCULAR SCREENING; LDL GOAL LESS THAN 160  Had a lipid screen 2 years ago.  Reasonably acceptable.  Repeat in the next few years.      Follow up with Provider -Follow-up in 1 year or as needed.  See Patient Instructions    The patient  understood the rational for the diagnosis and treatment plan. All questions were answered to best of my ability and the patient's satisfaction.    Note: Chart documentation done in part with Dragon Voice Recognition software. Although reviewed after completion, some word and grammatical errors may remain.  Please consider this when interpreting information in this chart.    Bony Bennett MD  [facility]

## 2018-10-17 ENCOUNTER — OFFICE VISIT (OUTPATIENT)
Dept: FAMILY MEDICINE | Facility: CLINIC | Age: 38
End: 2018-10-17
Payer: COMMERCIAL

## 2018-10-17 VITALS
SYSTOLIC BLOOD PRESSURE: 130 MMHG | WEIGHT: 167 LBS | BODY MASS INDEX: 26.21 KG/M2 | OXYGEN SATURATION: 97 % | DIASTOLIC BLOOD PRESSURE: 80 MMHG | HEART RATE: 83 BPM | HEIGHT: 67 IN | TEMPERATURE: 98.4 F

## 2018-10-17 DIAGNOSIS — F41.1 GENERALIZED ANXIETY DISORDER: Primary | ICD-10-CM

## 2018-10-17 DIAGNOSIS — Z13.6 CARDIOVASCULAR SCREENING; LDL GOAL LESS THAN 160: ICD-10-CM

## 2018-10-17 DIAGNOSIS — Z23 NEED FOR PROPHYLACTIC VACCINATION AND INOCULATION AGAINST INFLUENZA: ICD-10-CM

## 2018-10-17 DIAGNOSIS — Z72.0 TOBACCO USE: ICD-10-CM

## 2018-10-17 PROBLEM — F10.10 ALCOHOL ABUSE: Status: RESOLVED | Noted: 2017-11-30 | Resolved: 2018-10-17

## 2018-10-17 PROCEDURE — 99214 OFFICE O/P EST MOD 30 MIN: CPT | Mod: 25 | Performed by: FAMILY MEDICINE

## 2018-10-17 PROCEDURE — 90471 IMMUNIZATION ADMIN: CPT | Performed by: FAMILY MEDICINE

## 2018-10-17 PROCEDURE — 90686 IIV4 VACC NO PRSV 0.5 ML IM: CPT | Performed by: FAMILY MEDICINE

## 2018-10-17 RX ORDER — CITALOPRAM HYDROBROMIDE 20 MG/1
20 TABLET ORAL DAILY
Qty: 90 TABLET | Refills: 3 | Status: SHIPPED | OUTPATIENT
Start: 2018-10-17 | End: 2019-11-01

## 2018-10-17 ASSESSMENT — ANXIETY QUESTIONNAIRES
1. FEELING NERVOUS, ANXIOUS, OR ON EDGE: SEVERAL DAYS
GAD7 TOTAL SCORE: 5
6. BECOMING EASILY ANNOYED OR IRRITABLE: SEVERAL DAYS
7. FEELING AFRAID AS IF SOMETHING AWFUL MIGHT HAPPEN: NOT AT ALL
4. TROUBLE RELAXING: SEVERAL DAYS
3. WORRYING TOO MUCH ABOUT DIFFERENT THINGS: SEVERAL DAYS
5. BEING SO RESTLESS THAT IT IS HARD TO SIT STILL: NOT AT ALL
7. FEELING AFRAID AS IF SOMETHING AWFUL MIGHT HAPPEN: NOT AT ALL
2. NOT BEING ABLE TO STOP OR CONTROL WORRYING: SEVERAL DAYS
GAD7 TOTAL SCORE: 5
GAD7 TOTAL SCORE: 5

## 2018-10-17 ASSESSMENT — PAIN SCALES - GENERAL: PAINLEVEL: NO PAIN (0)

## 2018-10-17 ASSESSMENT — PATIENT HEALTH QUESTIONNAIRE - PHQ9
SUM OF ALL RESPONSES TO PHQ QUESTIONS 1-9: 3
SUM OF ALL RESPONSES TO PHQ QUESTIONS 1-9: 3
10. IF YOU CHECKED OFF ANY PROBLEMS, HOW DIFFICULT HAVE THESE PROBLEMS MADE IT FOR YOU TO DO YOUR WORK, TAKE CARE OF THINGS AT HOME, OR GET ALONG WITH OTHER PEOPLE: SOMEWHAT DIFFICULT

## 2018-10-17 NOTE — PATIENT INSTRUCTIONS
These are new changes to your current plan of care based on today's visit:    Medications stopped    Medications to be started    Change dose of this medication None   New treatments        Follow up appointments:    1.  FOLLOW UP WITH YOUR PRIMARY CARE PROVIDER: 1 year(s) for clinic visit --recommended general physical    E-mail the lab results from your life insurance physical    Bony Bennett MD

## 2018-10-17 NOTE — PROGRESS NOTES
Injectable Influenza Immunization Documentation  Prior to injection verified patient identity using patient's name and date of birth.  Due to injection administration, patient instructed to remain in clinic for 15 minutes  afterwards, and to report any adverse reaction to me immediately.      1.  Is the person to be vaccinated sick today?   No    2. Does the person to be vaccinated have an allergy to a component   of the vaccine?   No  Egg Allergy Algorithm Link    3. Has the person to be vaccinated ever had a serious reaction   to influenza vaccine in the past?   No    4. Has the person to be vaccinated ever had Guillain-Barré syndrome?   No    Form completed by Alan Morales MA

## 2018-10-17 NOTE — MR AVS SNAPSHOT
After Visit Summary   10/17/2018    Ashutosh Velasco    MRN: [id number]           Patient Information     Date Of Birth          1980        Visit Information        Provider Department      10/17/2018 11:40 AM Bony [facility] MD [facility]        Today's Diagnoses     Generalized anxiety disorder    -  1    Need for prophylactic vaccination and inoculation against influenza        Tobacco use        CARDIOVASCULAR SCREENING; LDL GOAL LESS THAN 160          Care Instructions    These are new changes to your current plan of care based on today's visit:    Medications stopped    Medications to be started    Change dose of this medication None   New treatments        Follow up appointments:    1.  FOLLOW UP WITH YOUR PRIMARY CARE PROVIDER: 1 year(s) for clinic visit --recommended general physical    E-mail the lab results from your life insurance physical    Bony Bennett MD                Follow-ups after your visit        Follow-up notes from your care team     Return in about 1 year (around 10/17/2019) for Routine Visit.      Who to contact     If you have questions or need follow up information about today's clinic visit or your schedule please contact [facility] directly at [phone].  Normal or non-critical lab and imaging results will be communicated to you by General Electrichart, letter or phone within 4 business days after the clinic has received the results. If you do not hear from us within 7 days, please contact the clinic through General Electrichart or phone. If you have a critical or abnormal lab result, we will notify you by phone as soon as possible.  Submit refill requests through Whisper or call your pharmacy and they will forward the refill request to us. Please allow 3 business days for your refill to be completed.          Additional Information About Your Visit        General Electrichart Information     Whisper gives you secure access to your electronic health record. If you see a  "primary care provider, you can also send messages to your care team and make appointments. If you have questions, please call your primary care clinic.  If you do not have a primary care provider, please call [phone] and they will assist you.        Care EveryWhere ID     This is your Care EveryWhere ID. This could be used by other organizations to access your [place] medical records  [id number]        Your Vitals Were     Pulse Temperature Height Pulse Oximetry BMI (Body Mass Index)       83 98.4  F (36.9  C) (Oral) 5' 7\" (1.702 m) 97% 26.16 kg/m2        Blood Pressure from Last 3 Encounters:   10/17/18 130/80   03/26/18 118/82   04/05/17 144/80    Weight from Last 3 Encounters:   10/17/18 167 lb (75.8 kg)   03/26/18 158 lb 4.8 oz (71.8 kg)   04/05/17 143 lb 3.2 oz (65 kg)              We Performed the Following     FLU VACCINE, SPLIT VIRUS, IM (QUADRIVALENT) [10961]- >3 YRS     TOBACCO CESSATION - FOR HEALTH MAINTENANCE     Vaccine Administration, Initial [58055]          Where to get your medicines      These medications were sent to [id number] - [place], [place] - [address]  [address] [address]    Hours:  test Rx sent successfully 12/26/02  KR Phone:  [phone]     citalopram 20 MG tablet          Primary Care Provider Office Phone # Fax #    Neal Molina [phone]       [address] [place] [id number]        Equal Access to Services     SARIAH REYNOSO AH: Hadii chuck stinson Soángela, waaxda luqadaha, qaybta kaalmaaonh brian. So [facility] [phone].    ATENCIÓN: Si habla español, tiene a schreiber disposición servicios gratuitos de asistencia lingüística. Paulo al [phone].    We comply with applicable federal civil rights laws and [place] laws. We do not discriminate on the basis of race, color, national origin, age, disability, sex, sexual orientation, or gender identity.            Thank " you!     Thank you for choosing [facility]  for your care. Our goal is always to provide you with excellent care. Hearing back from our patients is one way we can continue to improve our services. Please take a few minutes to complete the written survey that you may receive in the mail after your visit with us. Thank you!             Your Updated Medication List - Protect others around you: Learn how to safely use, store and throw away your medicines at www.disposemymeds.org.          This list is accurate as of 10/17/18 12:20 PM.  Always use your most recent med list.                   Brand Name Dispense Instructions for use Diagnosis    citalopram 20 MG tablet    celeXA    90 tablet    Take 1 tablet (20 mg) by mouth daily    Generalized anxiety disorder       LORazepam 0.5 MG tablet    ATIVAN    10 tablet    Take 1 tablet (0.5 mg) by mouth every 8 hours as needed for other (panic attacks)    Generalized anxiety disorder       MULTIVITAMINS PO      Take 1 tablet by mouth daily.

## 2018-10-18 ASSESSMENT — PATIENT HEALTH QUESTIONNAIRE - PHQ9: SUM OF ALL RESPONSES TO PHQ QUESTIONS 1-9: 3

## 2018-10-18 ASSESSMENT — ANXIETY QUESTIONNAIRES: GAD7 TOTAL SCORE: 5

## 2019-10-29 NOTE — PROGRESS NOTES
"SUBJECTIVE:   CC: Ashutosh Velasco is an 39 year old male who presents for preventative health visit.     Healthy Habits:     Getting at least 3 servings of Calcium per day:  Yes    Bi-annual eye exam:  Yes    Dental care twice a year:  Yes    Sleep apnea or symptoms of sleep apnea:  Excessive snoring    Diet:  Regular (no restrictions)    Frequency of exercise:  None    Taking medications regularly:  Yes    Medication side effects:  None    PHQ-2 Total Score: 0    Additional concerns today:  No    The patient states that he is wondering if he should be taking a multivitamin. He declines having a restrictive diet.     KVNG  The patient likes his Citalopram. He declines having side effects and would like to continue with the medication.   He notes that if he misses a dose, he will \"feel it the next day\".   He notes that he does not utilize Lorazepam at this time. He states that he carries it with him \"if needed\".     Exercise   The patient states that he does not exercise. He notes that he walks his dogs on occasion.    Tobacco use   The patient states that he used to chew tobacco previously. He notes that he does not do this anymore, as he quit 3 months ago.    Snoring   The patient states that he snores. He declines feeling sleepy during the day or stop breathing during sleep.    Prostate cancer   Patient declines family history of prostate cancer.     Today's PHQ-2 Score:   PHQ-2 ( 1999 Pfizer) 11/1/2019   Q1: Little interest or pleasure in doing things 0   Q2: Feeling down, depressed or hopeless 0   PHQ-2 Score 0   Q1: Little interest or pleasure in doing things Not at all   Q2: Feeling down, depressed or hopeless Not at all   PHQ-2 Score 0       Abuse: Current or Past(Physical, Sexual or Emotional)- No  Do you feel safe in your environment? Yes    Social History     Tobacco Use     Smoking status: Former Smoker     Packs/day: 1.00     Years: 10.00     Pack years: 10.00     Types: Dip, chew, snus or snuff     Smokeless " tobacco: Current User     Types: Chew     Tobacco comment: 1 tin a day   Substance Use Topics     Alcohol use: No     Comment: quit in November 2017     If you drink alcohol do you typically have >3 drinks per day or >7 drinks per week? No    Alcohol Use 11/1/2019   Prescreen: >3 drinks/day or >7 drinks/week? Not Applicable   Prescreen: >3 drinks/day or >7 drinks/week? -       Last PSA: No results found for: PSA    Reviewed orders with patient. Reviewed health maintenance and updated orders accordingly - Yes  BP Readings from Last 3 Encounters:   11/01/19 130/82   10/17/18 130/80   03/26/18 118/82    Wt Readings from Last 3 Encounters:   11/01/19 77.2 kg (170 lb 3.2 oz)   10/17/18 75.8 kg (167 lb)   03/26/18 71.8 kg (158 lb 4.8 oz)                  Patient Active Problem List   Diagnosis     Generalized anxiety disorder     CARDIOVASCULAR SCREENING; LDL GOAL LESS THAN 160     Tobacco use     Past Surgical History:   Procedure Laterality Date     APPENDECTOMY  1997     EYE SURGERY  5 years old    strabismus     HERNIORRHAPHY INGUINAL  11/8/2011    Procedure:HERNIORRHAPHY INGUINAL; OPEN RIGHT INGUINAL HERNIA REPAIR; Surgeon:NILDA VANCE; Location:[facility]       Social History     Tobacco Use     Smoking status: Former Smoker     Packs/day: 1.00     Years: 10.00     Pack years: 10.00     Types: Dip, chew, snus or snuff     Smokeless tobacco: Current User     Types: Chew     Tobacco comment: 1 tin a day   Substance Use Topics     Alcohol use: No     Comment: quit in November 2017     Family History   Problem Relation Age of Onset     Hypertension Father      Gastrointestinal Disease Father         chronic diarrhea     Coronary Artery Disease Paternal Grandfather      Cerebrovascular Disease Maternal Grandfather      Anxiety Disorder Sister      Thyroid Disease Mother         hyperthyroidism         Current Outpatient Medications   Medication Sig Dispense Refill     citalopram (CELEXA) 20 MG tablet Take 1 tablet  (20 mg) by mouth daily 90 tablet 3     LORazepam (ATIVAN) 0.5 MG tablet Take 1 tablet (0.5 mg) by mouth every 8 hours as needed for other (panic attacks) 10 tablet 0     Multiple Vitamin (MULTIVITAMINS PO) Take 1 tablet by mouth daily.       No Known Allergies  Recent Labs   Lab Test 12/13/16  1241 08/17/11  1633   *  --    HDL 62 44   TRIG 93  --    ALT 27  --    CR 1.18 1.04   GFRESTIMATED 70 84   GFRESTBLACK 85 >90   POTASSIUM 4.2 4.1   TSH 1.84 2.53        Reviewed and updated as needed this visit by clinical staff  Tobacco  Allergies  Meds  Problems  Med Hx  Surg Hx  Fam Hx         Reviewed and updated as needed this visit by Provider  Tobacco  Allergies  Meds  Problems  Med Hx  Surg Hx  Fam Hx        Review of Systems   Constitutional: Negative for chills and fever.   HENT: Negative for congestion, ear pain, hearing loss and sore throat.    Eyes: Negative for pain and visual disturbance.   Respiratory: Negative for cough and shortness of breath.    Cardiovascular: Negative for chest pain, palpitations and peripheral edema.   Gastrointestinal: Negative for abdominal pain, constipation, diarrhea, heartburn, hematochezia and nausea.   Genitourinary: Negative for discharge, dysuria, frequency, genital sores, hematuria, impotence and urgency.   Musculoskeletal: Negative for arthralgias, joint swelling and myalgias.   Skin: Negative for rash.   Neurological: Negative for dizziness, weakness, headaches and paresthesias.   Psychiatric/Behavioral: Negative for mood changes. The patient is nervous/anxious.      This document serves as a record of the services and decisions personally performed and made by Veronica Bay MD. It was created on her behalf by Yane Tyson, a trained medical scribe. The creation of this document is based the provider's statements to the medical scribe.    Yane Tyson November 1, 2019 2:30 PM  OBJECTIVE:   /82   Pulse 72   Temp 98.4  F (36.9  C) (Temporal)  "  Resp 16   Ht 1.7 m (5' 6.93\")   Wt 77.2 kg (170 lb 3.2 oz)   BMI 26.71 kg/m      Physical Exam  GENERAL: healthy, alert and no distress  EYES: Eyes grossly normal to inspection, PERRL and conjunctivae and sclerae normal  HENT: ear canals and TM's normal, nose and mouth without ulcers or lesions  NECK: no adenopathy, no asymmetry, masses, or scars and thyroid normal to palpation  RESP: lungs clear to auscultation - no rales, rhonchi or wheezes  CV: regular rate and rhythm, normal S1 S2, no S3 or S4, no murmur, click or rub, no peripheral edema and peripheral pulses strong  ABDOMEN: soft, nontender, no hepatosplenomegaly, no masses and bowel sounds normal  MS: no gross musculoskeletal defects noted, no edema  SKIN: no suspicious lesions or rashes  NEURO: Normal strength and tone, mentation intact and speech normal  PSYCH: mentation appears normal, affect normal/bright    Diagnostic Test Results:  No results found for this or any previous visit (from the past 24 hour(s)).    ASSESSMENT/PLAN:   1. Routine general medical examination at a health care facility  Routine general medical exam was administered today.    Advised patient of health benefits regular exercise and healthy diet can bring- Expressed importance of this.   Patient will come back in 1 year for physical exam. Sooner if needed.   See counseling messages below.    2. Generalized anxiety disorder  Doing well. Well controlled. Tolerating medication.  No change in plan.   Refills have been ordered.   - citalopram (CELEXA) 20 MG tablet; Take 1 tablet (20 mg) by mouth daily  Dispense: 90 tablet; Refill: 3    3. Screening for diabetes mellitus  Labs have been ordered.   Awaiting results.  - Hemoglobin A1c    4. Lipid screening  Labs have been ordered.   Will notify with results.   - Lipid panel reflex to direct LDL Fasting    COUNSELING:   Special attention given to:        Regular exercise       Healthy diet/nutrition       Vision screening       Hearing " "screening       Prostate cancer screening        Immunizations    Vaccinated for: Influenza      Estimated body mass index is 26.71 kg/m  as calculated from the following:    Height as of this encounter: 1.7 m (5' 6.93\").    Weight as of this encounter: 77.2 kg (170 lb 3.2 oz).     Weight management plan: Discussed healthy diet and exercise guidelines     reports that he has quit smoking. His smoking use included dip, chew, snus or snuff. He has a 10.00 pack-year smoking history. His smokeless tobacco use includes chew.      Counseling Resources:  ATP IV Guidelines  Pooled Cohorts Equation Calculator  FRAX Risk Assessment  ICSI Preventive Guidelines  Dietary Guidelines for Americans, 2010  [employer]'s MyPlate  ASA Prophylaxis  Lung CA Screening    The information in this document, created by the medical scribe for me, accurately reflects the services I personally performed and the decisions made by me. I have reviewed and approved this document for accuracy prior to leaving the patient care area.    Veronica Bay MD  [facility]  Answers for HPI/ROS submitted by the patient on 11/1/2019   Annual Exam:  If you checked off any problems, how difficult have these problems made it for you to do your work, take care of things at home, or get along with other people?: Not difficult at all  PHQ9 TOTAL SCORE: 2  KVNG 7 TOTAL SCORE: 4    "

## 2019-11-01 ENCOUNTER — OFFICE VISIT (OUTPATIENT)
Dept: FAMILY MEDICINE | Facility: CLINIC | Age: 39
End: 2019-11-01
Payer: COMMERCIAL

## 2019-11-01 VITALS
HEIGHT: 67 IN | DIASTOLIC BLOOD PRESSURE: 82 MMHG | TEMPERATURE: 98.4 F | RESPIRATION RATE: 16 BRPM | WEIGHT: 170.2 LBS | HEART RATE: 72 BPM | SYSTOLIC BLOOD PRESSURE: 130 MMHG | BODY MASS INDEX: 26.71 KG/M2

## 2019-11-01 DIAGNOSIS — Z00.00 ROUTINE GENERAL MEDICAL EXAMINATION AT A HEALTH CARE FACILITY: Primary | ICD-10-CM

## 2019-11-01 DIAGNOSIS — F41.1 GENERALIZED ANXIETY DISORDER: ICD-10-CM

## 2019-11-01 DIAGNOSIS — Z13.220 LIPID SCREENING: ICD-10-CM

## 2019-11-01 DIAGNOSIS — Z13.1 SCREENING FOR DIABETES MELLITUS: ICD-10-CM

## 2019-11-01 LAB
CHOLEST SERPL-MCNC: 183 MG/DL
HBA1C MFR BLD: 5 % (ref 0–5.6)
HDLC SERPL-MCNC: 40 MG/DL
LDLC SERPL CALC-MCNC: 84 MG/DL
NONHDLC SERPL-MCNC: 143 MG/DL
TRIGL SERPL-MCNC: 294 MG/DL

## 2019-11-01 PROCEDURE — 99395 PREV VISIT EST AGE 18-39: CPT | Mod: 25 | Performed by: FAMILY MEDICINE

## 2019-11-01 PROCEDURE — 36415 COLL VENOUS BLD VENIPUNCTURE: CPT | Performed by: FAMILY MEDICINE

## 2019-11-01 PROCEDURE — 90471 IMMUNIZATION ADMIN: CPT | Performed by: FAMILY MEDICINE

## 2019-11-01 PROCEDURE — 96127 BRIEF EMOTIONAL/BEHAV ASSMT: CPT | Performed by: FAMILY MEDICINE

## 2019-11-01 PROCEDURE — 90686 IIV4 VACC NO PRSV 0.5 ML IM: CPT | Performed by: FAMILY MEDICINE

## 2019-11-01 PROCEDURE — 80061 LIPID PANEL: CPT | Performed by: FAMILY MEDICINE

## 2019-11-01 PROCEDURE — 83036 HEMOGLOBIN GLYCOSYLATED A1C: CPT | Performed by: FAMILY MEDICINE

## 2019-11-01 RX ORDER — CITALOPRAM HYDROBROMIDE 20 MG/1
20 TABLET ORAL DAILY
Qty: 90 TABLET | Refills: 3 | Status: SHIPPED | OUTPATIENT
Start: 2019-11-01 | End: 2020-10-21

## 2019-11-01 ASSESSMENT — ANXIETY QUESTIONNAIRES
6. BECOMING EASILY ANNOYED OR IRRITABLE: SEVERAL DAYS
4. TROUBLE RELAXING: NOT AT ALL
3. WORRYING TOO MUCH ABOUT DIFFERENT THINGS: SEVERAL DAYS
5. BEING SO RESTLESS THAT IT IS HARD TO SIT STILL: NOT AT ALL
GAD7 TOTAL SCORE: 4
GAD7 TOTAL SCORE: 4
7. FEELING AFRAID AS IF SOMETHING AWFUL MIGHT HAPPEN: NOT AT ALL
2. NOT BEING ABLE TO STOP OR CONTROL WORRYING: SEVERAL DAYS
1. FEELING NERVOUS, ANXIOUS, OR ON EDGE: SEVERAL DAYS
GAD7 TOTAL SCORE: 4
7. FEELING AFRAID AS IF SOMETHING AWFUL MIGHT HAPPEN: NOT AT ALL

## 2019-11-01 ASSESSMENT — MIFFLIN-ST. JEOR: SCORE: 1654.52

## 2019-11-01 ASSESSMENT — PATIENT HEALTH QUESTIONNAIRE - PHQ9
10. IF YOU CHECKED OFF ANY PROBLEMS, HOW DIFFICULT HAVE THESE PROBLEMS MADE IT FOR YOU TO DO YOUR WORK, TAKE CARE OF THINGS AT HOME, OR GET ALONG WITH OTHER PEOPLE: NOT DIFFICULT AT ALL
SUM OF ALL RESPONSES TO PHQ QUESTIONS 1-9: 2
SUM OF ALL RESPONSES TO PHQ QUESTIONS 1-9: 2

## 2019-11-01 ASSESSMENT — ENCOUNTER SYMPTOMS
DYSURIA: 0
HEADACHES: 0
CHILLS: 0
HEMATOCHEZIA: 0
SHORTNESS OF BREATH: 0
COUGH: 0
HEMATURIA: 0
DIZZINESS: 0
JOINT SWELLING: 0
ABDOMINAL PAIN: 0
PARESTHESIAS: 0
HEARTBURN: 0
WEAKNESS: 0
EYE PAIN: 0
CONSTIPATION: 0
FEVER: 0
DIARRHEA: 0
ARTHRALGIAS: 0
PALPITATIONS: 0
MYALGIAS: 0
SORE THROAT: 0
NAUSEA: 0
FREQUENCY: 0
NERVOUS/ANXIOUS: 1

## 2019-11-02 ASSESSMENT — PATIENT HEALTH QUESTIONNAIRE - PHQ9: SUM OF ALL RESPONSES TO PHQ QUESTIONS 1-9: 2

## 2019-11-02 ASSESSMENT — ANXIETY QUESTIONNAIRES: GAD7 TOTAL SCORE: 4

## 2020-10-16 NOTE — PROGRESS NOTES
SUBJECTIVE:   CC: Ashutosh Velasco is an 40 year old male who presents for preventative health visit.     \  Patient has been advised of split billing requirements and indicates understanding: Yes  Healthy Habits:     Getting at least 3 servings of Calcium per day:  Yes    Bi-annual eye exam:  Yes    Dental care twice a year:  Yes    Sleep apnea or symptoms of sleep apnea:  None    Diet:  Low fat/cholesterol and Carbohydrate counting    Frequency of exercise:  4-5 days/week    Duration of exercise:  45-60 minutes    Taking medications regularly:  Yes    Medication side effects:  None    PHQ-2 Total Score: 0    Additional concerns today:  No          Anxiety Follow-Up    How are you doing with your anxiety since your last visit? No change    Are you having other symptoms that might be associated with anxiety? No    Have you had a significant life event? No     Are you feeling depressed? No    Do you have any concerns with your use of alcohol or other drugs? No    Social History     Tobacco Use     Smoking status: Former Smoker     Packs/day: 1.00     Years: 10.00     Pack years: 10.00     Types: Dip, chew, snus or snuff     Quit date: 2019     Years since quittin.2     Smokeless tobacco: Current User     Types: Chew     Tobacco comment: 1 tin a day   Substance Use Topics     Alcohol use: No     Comment: quit in 2017     Drug use: No     KVNG-7 SCORE 10/17/2018 2019 10/20/2020   Total Score - - -   Total Score 5 (mild anxiety) 4 (minimal anxiety) 2 (minimal anxiety)   Total Score 5 4 2     PHQ 10/17/2018 2019 10/20/2020   PHQ-9 Total Score 3 2 0   Q9: Thoughts of better off dead/self-harm past 2 weeks Not at all Not at all Not at all           Today's PHQ-2 Score:   PHQ-2 (  [employer]) 10/20/2020   Q1: Little interest or pleasure in doing things 0   Q2: Feeling down, depressed or hopeless 0   PHQ-2 Score 0   Q1: Little interest or pleasure in doing things Not at all   Q2: Feeling down, depressed  or hopeless Not at all   PHQ-2 Score 0       Abuse: Current or Past(Physical, Sexual or Emotional)- No  Do you feel safe in your environment? Yes        Social History     Tobacco Use     Smoking status: Former Smoker     Packs/day: 1.00     Years: 10.00     Pack years: 10.00     Types: Dip, chew, snus or snuff     Quit date: 2019     Years since quittin.2     Smokeless tobacco: Current User     Types: Chew     Tobacco comment: 1 tin a day   Substance Use Topics     Alcohol use: No     Comment: quit in 2017     If you drink alcohol do you typically have >3 drinks per day or >7 drinks per week? No    Alcohol Use 10/21/2020   Prescreen: >3 drinks/day or >7 drinks/week? -   Prescreen: >3 drinks/day or >7 drinks/week? No   No flowsheet data found.    Last PSA: No results found for: PSA    Reviewed orders with patient. Reviewed health maintenance and updated orders accordingly - Yes  BP Readings from Last 3 Encounters:   10/21/20 130/74   19 130/82   10/17/18 130/80    Wt Readings from Last 3 Encounters:   10/21/20 71.5 kg (157 lb 11.2 oz)   19 77.2 kg (170 lb 3.2 oz)   10/17/18 75.8 kg (167 lb)                    Reviewed and updated as needed this visit by clinical staff  Tobacco  Allergies  Meds  Problems  Med Hx  Surg Hx  Fam Hx  Soc Hx          Reviewed and updated as needed this visit by Provider  Tobacco  Allergies  Meds  Problems  Med Hx  Surg Hx  Fam Hx             Review of Systems   Constitutional: Negative for chills and fever.   HENT: Negative for congestion, ear pain, hearing loss and sore throat.    Eyes: Negative for pain and visual disturbance.   Respiratory: Negative for cough and shortness of breath.    Cardiovascular: Negative for chest pain, palpitations and peripheral edema.   Gastrointestinal: Negative for abdominal pain, constipation, diarrhea, heartburn, hematochezia and nausea.   Genitourinary: Negative for discharge, dysuria, frequency, genital sores,  "hematuria, impotence and urgency.   Musculoskeletal: Negative for arthralgias, joint swelling and myalgias.   Skin: Negative for rash.   Neurological: Negative for dizziness, weakness, headaches and paresthesias.   Psychiatric/Behavioral: Negative for mood changes. The patient is not nervous/anxious.          OBJECTIVE:   /74   Pulse 74   Temp 97.9  F (36.6  C) (Temporal)   Resp 16   Ht 1.696 m (5' 6.78\")   Wt 71.5 kg (157 lb 11.2 oz)   SpO2 98%   BMI 24.86 kg/m      Physical Exam  GENERAL: healthy, alert and no distress  EYES: Eyes grossly normal to inspection, PERRL and conjunctivae and sclerae normal  HENT: ear canals and TM's normal, nose and mouth without ulcers or lesions  NECK: no adenopathy, no asymmetry, masses, or scars and thyroid normal to palpation  RESP: lungs clear to auscultation - no rales, rhonchi or wheezes  CV: regular rate and rhythm, normal S1 S2, no S3 or S4, no murmur, click or rub, no peripheral edema and peripheral pulses strong  ABDOMEN: soft, nontender, no hepatosplenomegaly, no masses and bowel sounds normal  MS: no gross musculoskeletal defects noted, no edema  SKIN: no suspicious lesions or rashes  NEURO: Normal strength and tone, mentation intact and speech normal  PSYCH: mentation appears normal, affect normal/bright    Diagnostic Test Results:  Labs reviewed in Epic    ASSESSMENT/PLAN:   1. Routine general medical examination at a health care facility  See counseling messages    2. Generalized anxiety disorder  Patient is doing well without concerns.  No changes needed at this time.  Refilled for 1 year.  - citalopram (CELEXA) 20 MG tablet; Take 1 tablet (20 mg) by mouth daily  Dispense: 90 tablet; Refill: 3    Patient has been advised of split billing requirements and indicates understanding: Yes  COUNSELING:   Reviewed preventive health counseling, as reflected in patient instructions       Regular exercise       Healthy diet/nutrition    Estimated body mass index is " "24.86 kg/m  as calculated from the following:    Height as of this encounter: 1.696 m (5' 6.78\").    Weight as of this encounter: 71.5 kg (157 lb 11.2 oz).         He reports that he quit smoking about 14 months ago. His smoking use included dip, chew, snus or snuff. He has a 10.00 pack-year smoking history. His smokeless tobacco use includes chew.      Counseling Resources:  ATP IV Guidelines  Pooled Cohorts Equation Calculator  FRAX Risk Assessment  ICSI Preventive Guidelines  Dietary Guidelines for Americans, 2010  [employer]'s MyPlate  ASA Prophylaxis  Lung CA Screening    Veronica Bay MD  [facility]  Answers for HPI/ROS submitted by the patient on 10/20/2020   Annual Exam:  If you checked off any problems, how difficult have these problems made it for you to do your work, take care of things at home, or get along with other people?: Not difficult at all  PHQ9 TOTAL SCORE: 0  KVNG 7 TOTAL SCORE: 2    "

## 2020-10-20 ASSESSMENT — ANXIETY QUESTIONNAIRES
GAD7 TOTAL SCORE: 2
5. BEING SO RESTLESS THAT IT IS HARD TO SIT STILL: NOT AT ALL
1. FEELING NERVOUS, ANXIOUS, OR ON EDGE: SEVERAL DAYS
6. BECOMING EASILY ANNOYED OR IRRITABLE: NOT AT ALL
7. FEELING AFRAID AS IF SOMETHING AWFUL MIGHT HAPPEN: NOT AT ALL
GAD7 TOTAL SCORE: 2
GAD7 TOTAL SCORE: 2
7. FEELING AFRAID AS IF SOMETHING AWFUL MIGHT HAPPEN: NOT AT ALL
2. NOT BEING ABLE TO STOP OR CONTROL WORRYING: NOT AT ALL
3. WORRYING TOO MUCH ABOUT DIFFERENT THINGS: SEVERAL DAYS
4. TROUBLE RELAXING: NOT AT ALL

## 2020-10-20 ASSESSMENT — ENCOUNTER SYMPTOMS
PALPITATIONS: 0
HEMATURIA: 0
ARTHRALGIAS: 0
DIARRHEA: 0
MYALGIAS: 0
CHILLS: 0
PARESTHESIAS: 0
NERVOUS/ANXIOUS: 0
ABDOMINAL PAIN: 0
SHORTNESS OF BREATH: 0
SORE THROAT: 0
CONSTIPATION: 0
WEAKNESS: 0
EYE PAIN: 0
FREQUENCY: 0
DIZZINESS: 0
DYSURIA: 0
HEMATOCHEZIA: 0
FEVER: 0
COUGH: 0
HEARTBURN: 0
HEADACHES: 0
JOINT SWELLING: 0
NAUSEA: 0

## 2020-10-20 ASSESSMENT — PATIENT HEALTH QUESTIONNAIRE - PHQ9
10. IF YOU CHECKED OFF ANY PROBLEMS, HOW DIFFICULT HAVE THESE PROBLEMS MADE IT FOR YOU TO DO YOUR WORK, TAKE CARE OF THINGS AT HOME, OR GET ALONG WITH OTHER PEOPLE: NOT DIFFICULT AT ALL
SUM OF ALL RESPONSES TO PHQ QUESTIONS 1-9: 0
SUM OF ALL RESPONSES TO PHQ QUESTIONS 1-9: 0

## 2020-10-21 ENCOUNTER — OFFICE VISIT (OUTPATIENT)
Dept: FAMILY MEDICINE | Facility: CLINIC | Age: 40
End: 2020-10-21
Payer: COMMERCIAL

## 2020-10-21 VITALS
RESPIRATION RATE: 16 BRPM | HEART RATE: 74 BPM | TEMPERATURE: 97.9 F | WEIGHT: 157.7 LBS | DIASTOLIC BLOOD PRESSURE: 74 MMHG | OXYGEN SATURATION: 98 % | HEIGHT: 67 IN | SYSTOLIC BLOOD PRESSURE: 130 MMHG | BODY MASS INDEX: 24.75 KG/M2

## 2020-10-21 DIAGNOSIS — F41.1 GENERALIZED ANXIETY DISORDER: ICD-10-CM

## 2020-10-21 DIAGNOSIS — Z00.00 ROUTINE GENERAL MEDICAL EXAMINATION AT A HEALTH CARE FACILITY: Primary | ICD-10-CM

## 2020-10-21 PROCEDURE — 90686 IIV4 VACC NO PRSV 0.5 ML IM: CPT | Performed by: FAMILY MEDICINE

## 2020-10-21 PROCEDURE — 90471 IMMUNIZATION ADMIN: CPT | Performed by: FAMILY MEDICINE

## 2020-10-21 PROCEDURE — 99395 PREV VISIT EST AGE 18-39: CPT | Mod: 25 | Performed by: FAMILY MEDICINE

## 2020-10-21 RX ORDER — CITALOPRAM HYDROBROMIDE 20 MG/1
20 TABLET ORAL DAILY
Qty: 90 TABLET | Refills: 3 | Status: SHIPPED | OUTPATIENT
Start: 2020-10-21 | End: 2021-05-03

## 2020-10-21 ASSESSMENT — ENCOUNTER SYMPTOMS
PARESTHESIAS: 0
COUGH: 0
HEMATOCHEZIA: 0
NAUSEA: 0
DYSURIA: 0
WEAKNESS: 0
ARTHRALGIAS: 0
DIZZINESS: 0
HEADACHES: 0
DIARRHEA: 0
FEVER: 0
EYE PAIN: 0
SORE THROAT: 0
FREQUENCY: 0
MYALGIAS: 0
ABDOMINAL PAIN: 0
SHORTNESS OF BREATH: 0
CONSTIPATION: 0
HEMATURIA: 0
CHILLS: 0
JOINT SWELLING: 0
NERVOUS/ANXIOUS: 0
PALPITATIONS: 0
HEARTBURN: 0

## 2020-10-21 ASSESSMENT — ANXIETY QUESTIONNAIRES: GAD7 TOTAL SCORE: 2

## 2020-10-21 ASSESSMENT — PATIENT HEALTH QUESTIONNAIRE - PHQ9: SUM OF ALL RESPONSES TO PHQ QUESTIONS 1-9: 0

## 2020-10-21 ASSESSMENT — MIFFLIN-ST. JEOR: SCORE: 1590.5

## 2021-04-30 ENCOUNTER — MYC MEDICAL ADVICE (OUTPATIENT)
Dept: FAMILY MEDICINE | Facility: CLINIC | Age: 41
End: 2021-04-30

## 2021-04-30 DIAGNOSIS — F41.1 GENERALIZED ANXIETY DISORDER: ICD-10-CM

## 2021-05-03 RX ORDER — CITALOPRAM HYDROBROMIDE 20 MG/1
20 TABLET ORAL DAILY
Qty: 90 TABLET | Refills: 1 | Status: SHIPPED | OUTPATIENT
Start: 2021-05-03 | End: 2022-06-01

## 2021-05-03 NOTE — TELEPHONE ENCOUNTER
Switching to mail order.    Jerzy Ferraro, ERNESTON, RN, PHN  [facility] ~ Lead Registered Nurse  Clinic Triage ~ [place] [facility] [place]  May 3, 2021

## 2021-10-02 ENCOUNTER — HEALTH MAINTENANCE LETTER (OUTPATIENT)
Age: 41
End: 2021-10-02

## 2021-11-27 ENCOUNTER — HEALTH MAINTENANCE LETTER (OUTPATIENT)
Age: 41
End: 2021-11-27

## 2022-04-05 ENCOUNTER — REFERRAL (OUTPATIENT)
Dept: TRANSPLANT | Facility: CLINIC | Age: 42
End: 2022-04-05
Payer: COMMERCIAL

## 2022-04-05 NOTE — TELEPHONE ENCOUNTER
"Donor Intake Start:3/13/22Donor Intake Complete:3/13/22  Expiration Date:22  Gender:MalePreferred Language:English  Full Name:Ashutosh Ulrich  Needed:[not answered]  Phone Number:[phone] Phone:  Contact Preference:[not answered]Best Contact Time:9am - 5pm  Emergency Contact:Lucy Mcghee Contact #:[phone]  Relationship to Contact:Contact is my spouse  :[birth year]Age:41  Country:[place]  Address:[address] [place] [address] [place]  [id number]  Height:5'8\"Weight:145lbs  BMI:22  Employment Status:EmployedHas PTO for donation?Yes, using vacation  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  Living Donor Pre-Screening  Is In [place]  Yes  Will Accept Blood Transfusions?  Yes  Has been Diagnosed with Kidney Disease?  No  Has had a Heart Attack?  No  Has Diabetes?  No  Has had Cancer?  No  Has had Kidney Stones?  No  Has Used Tobacco  Yes    - Currently uses Tobacco?  No    - Will Stop for Surgery?N/A    - How Many Years:18    - Tobacco use (packs/cans) / Frequency:1 / Daily  Has HIV?  No  Is Currently Incarcerated?  No  Is Currently Residing in [place]  Yes  History Misc  Has Allergies?  No  Has had Surgeries?  Yes  Surgery When  Hernia repair 2011  Appendectomy 1996  Takes Medication?  No  Medical History  History of High BP?  Never  Has History Of CABG (bypass surgery)?  No  History of Blood Clots?  Never  History of " Coronary Disease?  Never  Has Stents Implanted?  No  Has History of Chest Pain with Exercise?  No  Has History of Chest Pain at Other Times?  No  Results of Climbing 2 Flights of Stairs?No Problem  Has had Stress Test within Last Year?  No  Has had Stroke?  No  Has had Leg Bypass?  No  History of Lung Disease?  Never  History of COPD?  Never  History of TB?  Never    - Is TB Active?[not answered]  History of Pneumonia?  Never  Has Respiratory Issues?  No  Has Gastro Issues?  No  History of Gallstones?  Never  History of Pancreatitis?  Never  History of Liver Disease?  Never  History of Hepatitis B?  Never    - Is Hep B Active?[not answered]  History of Hepatitis C?  Never  History of Bleeding Problem?  Never  History of UTIs?  No  History of Kidney Damage?  Never  History of Proteinuria?  Never  History of Hematuria?  Never  History of Neuro Disease?  Never  History of Seizure?  Never  History of Lupus?  Never  History of Paralysis?  Never  History of Arthritis?  Never  History of Neuropathy?  Never  History of Depression?  Never  History of Anxiety?  Still being treated  History of Documented Psychiatric Illness?  Never  Has had Transfusions?  No  History of Obesity?  No  History of Fabry's Disease?  No  History of Sickle Cell Disease?  No  History of Sickle Cell Trait?  No  History of Sarcoidosis?  No  History of Auto-Immune Disease  No  Has had Physical Exam?  Yes    - how many years ago:1  Has had PSA Test?  Unknown  Has had Colonoscopy?  No  Medical History Comments?Inguinal Hernea repair and appendectomy.  Living Donor Family Medical History  Anyone with kidney disease?  No  Anyone with liver disease?  No  Anyone with heart disease?  Yes    - which family members:Paternal and maternal grandfathers  Anyone with coronary artery disease?  Unknown  Anyone with high blood pressure?  Yes    - which family members:Father  Anyone with blood disorder?  No  Anyone with cancer?  No  Anyone with kidney cancer?  No  Anyone  with diabetes?  No  Is mother alive?  Yes  Mother's age?66  Is father alive?  Yes  Father's age?68  How many siblings?1  How many adult children?0  How many children under 18?0  Social History  Has Used Alcohol?  Yes    - currently uses alcohol:  No    - how much:5/Weekly  Has Abused Alcohol?  Yes  Has Used Drugs?  Yes  Drugs UsedLast UsedGot Treatment?  [id number] years  No     Has had legal issues w/ law enforcement?  Yes  Has been sentenced to residential?  No  Traveled over 100 miles from home in last year?  Yes    - Traveled Where?[place]  Has had suicidal thoughts or attempts in the last five years?  No

## 2022-04-08 ENCOUNTER — DOCUMENTATION ONLY (OUTPATIENT)
Dept: TRANSPLANT | Facility: CLINIC | Age: 42
End: 2022-04-08
Payer: COMMERCIAL

## 2022-04-15 ENCOUNTER — TELEPHONE (OUTPATIENT)
Dept: TRANSPLANT | Facility: CLINIC | Age: 42
End: 2022-04-15
Payer: COMMERCIAL

## 2022-04-15 NOTE — TELEPHONE ENCOUNTER
Initial Independent Living Donor Advocate contact made with potential donor today.  I introduced myself and my role during the donation process, includin.  ROSCOE ROLE   The federal government requires that all licensed transplant centers provide the living donor with an Independent Living Donor Advocate (ROSCOE).  I do not meet recipients or attend meetings that discuss their care or decision to transplant them. My role is separate to avoid any conflict of interest.  My role is to ensure:  1) your rights are protected;  2) you get all the information you need from the transplant team to make a fully informed decision whether to donate;   3) that living donation is in your best interest.   4) that you have the right to decide NOT to go forward with living donation at any time during this process.  I am available to you throughout the workup, during surgery phase and follow-up at home.   2. WORKUP & PRIVACY     Your identity and workup are not shared with the recipient at any time.     There is a medical donor workup that consists of testing to determine if you are healthy enough to donate.  Workup tests include many blood draws, urine collection/ (kidney function testing), chest x-ray, EKG, CT scan. As you complete each step then you may move on to the next.  Workup can take as little or as long as you need and you can stop the process at any time.     Transplant is a treatment option, not a cure. A kidney from a living kidney donor can last 12-14 years.  Other treatment options are  donation and two types of dialysis.     This is major surgery and your estimated hospital stay is approximately 1-2 nights.  After surgery, there are driving and lifting restrictions - no driving for two weeks and no lifting over ten pounds for 6 - 8 weeks.  Donors are routinely off from work for 4 - 6 weeks after surgery, and potentially longer if they have a physical job.       If you anticipate lost wages due to donation,  donor wage reimbursement options may be available to you and will be reviewed with you during the evaluation process.      The recipient's insurance covers the medical expenses related to the donor evaluation and surgery.  However, it is important for you to carry your own health insurance to address any medical issues that are found and are NOT related to living donation.  3.  QUESTIONS  Have you received a packet from the transplant department?     Questions?    Have you discussed with anyone your potential decision to donate?   Yes.  Is anyone pressuring or coercing you to donate? No.  Have you discussed any financial arrangements with recipient around donating a kidney? No.  Are you aware that you can confidentially opt out at any time, up to and including day of donation? Yes.  At this time, would you like to proceed with the medical evaluation to see if you can be a kidney donor?  Yes.    If yes, the donor coordinator will be reaching out to you with next steps.     You can reach me or someone else on the ROSCOE team by calling [phone] Option 3.    ROSCOE NOTES: Ashutosh reports that both he and his wife have come forward to see if they can be donors for his wife's friend's son, Niall Blum.  Ashutosh reports that he had anxiety that is under good control.  He takes medication prescribed by his PCP, and his symptoms do not interfere with his work, sleep, or ability to do his ADLs     Due to Internet issues, I was unable to schedule his nurse phone call with Cherelle Jones RN, so I asked that he follow up with her early next week.      Duration of call 40 minutes

## 2022-04-19 ENCOUNTER — TELEPHONE (OUTPATIENT)
Dept: TRANSPLANT | Facility: CLINIC | Age: 42
End: 2022-04-19
Payer: COMMERCIAL

## 2022-04-19 NOTE — TELEPHONE ENCOUNTER
Contacted Ashutosh Velasco to introduce myself and my role, review of medical/surgical/family history and next steps.     Ashutosh Velasco  is aware He can stop donor evaluation at any time.    Have you ever been positive for COVID 19? no    Have you received the COVID vaccination? yes If yes, when and what brand? Moderna    Review risk of COVID-19 to living donors.     Ashutosh Velasco is a 41 year old male  ABO A that would like to learn more about friends child.     Concerns from medical/surgical/family history: Had pulmonary nodules noted back in 2011. Chest ct scan showed IMPRESSION:   1. Two small indeterminate nodules in the right lower lobe identified. Followup in 6 to 12 months might be helpful for further evaluation if clinically indicated.   2. Mild parenchymal loss in the upper poles of both kidneys which may be secondary to previous infection.   I did send a chart message to Dr. Saunders to review CT scan to see if he was able to move forward    Reviewed any history of travel in endemic areas:   Strongyloides- [place], [place] and [place].  Trypanosoma cruzi ([place])- [place]  West Nile Virus- [place], [place], [place], [place] and [place].     Per our Phase 1 algorithm, does meet criteria to do preliminary testing.     Reviewed NKR listing and transfer of care to KPD team if approved. Provided Ashutosh with NKR website to review.     Briefly went over options if approved of NDD, SVP and FVP.     Confirmed that Ashutosh reviewed Informed consent document and all questions answered.  Reviewed that they will receive [employer] to obtain electronic signature for the following: Informed consent, SRTR data, SARAI for medical information, Auth for Electronic communication and will need their signed consent back before proceeding with evaluation.      Encouraged sign up for [employer] and confirmed [facility] sign up.     Verified recipient status if not NDD.    Donor timeline: TBD     Will send orders to  scheduling team to set up for phase 1 testing.

## 2022-04-20 DIAGNOSIS — Z00.5 TRANSPLANT DONOR EVALUATION: Primary | ICD-10-CM

## 2022-04-26 ENCOUNTER — LAB (OUTPATIENT)
Dept: LAB | Facility: CLINIC | Age: 42
End: 2022-04-26
Payer: COMMERCIAL

## 2022-04-26 DIAGNOSIS — Z00.5 TRANSPLANT DONOR EVALUATION: ICD-10-CM

## 2022-04-26 LAB
ABO/RH(D): NORMAL
ALBUMIN UR-MCNC: NEGATIVE MG/DL
APPEARANCE UR: CLEAR
BILIRUB UR QL STRIP: NEGATIVE
COLOR UR AUTO: YELLOW
CREAT SERPL-MCNC: 1.14 MG/DL (ref 0.66–1.25)
CREAT UR-MCNC: 169 MG/DL
CREAT UR-MCNC: 176 MG/DL
FASTING STATUS PATIENT QL REPORTED: YES
GFR SERPL CREATININE-BSD FRML MDRD: 84 ML/MIN/1.73M2
GLUCOSE BLD-MCNC: 91 MG/DL (ref 70–99)
GLUCOSE UR STRIP-MCNC: NEGATIVE MG/DL
HGB BLD-MCNC: 14.6 G/DL (ref 13.3–17.7)
HGB UR QL STRIP: NEGATIVE
KETONES UR STRIP-MCNC: NEGATIVE MG/DL
LEUKOCYTE ESTERASE UR QL STRIP: NEGATIVE
MICROALBUMIN UR-MCNC: 30 MG/L
MICROALBUMIN/CREAT UR: 17.05 MG/G CR (ref 0–17)
NITRATE UR QL: NEGATIVE
PH UR STRIP: 6 [PH] (ref 5–7)
PROT UR-MCNC: 0.13 G/L
PROT/CREAT 24H UR: 0.08 G/G CR (ref 0–0.2)
SP GR UR STRIP: 1.02 (ref 1–1.03)
SPECIMEN EXPIRATION DATE: NORMAL
UROBILINOGEN UR STRIP-ACNC: 0.2 E.U./DL

## 2022-04-26 PROCEDURE — 82043 UR ALBUMIN QUANTITATIVE: CPT

## 2022-04-26 PROCEDURE — 82947 ASSAY GLUCOSE BLOOD QUANT: CPT

## 2022-04-26 PROCEDURE — 82565 ASSAY OF CREATININE: CPT

## 2022-04-26 PROCEDURE — 84156 ASSAY OF PROTEIN URINE: CPT

## 2022-04-26 PROCEDURE — 81003 URINALYSIS AUTO W/O SCOPE: CPT

## 2022-04-26 PROCEDURE — 85018 HEMOGLOBIN: CPT

## 2022-04-26 PROCEDURE — 86901 BLOOD TYPING SEROLOGIC RH(D): CPT

## 2022-04-26 PROCEDURE — 36415 COLL VENOUS BLD VENIPUNCTURE: CPT

## 2022-04-26 PROCEDURE — 86900 BLOOD TYPING SEROLOGIC ABO: CPT

## 2022-04-29 ENCOUNTER — ALLIED HEALTH/NURSE VISIT (OUTPATIENT)
Dept: FAMILY MEDICINE | Facility: CLINIC | Age: 42
End: 2022-04-29
Payer: COMMERCIAL

## 2022-04-29 VITALS
WEIGHT: 147 LBS | DIASTOLIC BLOOD PRESSURE: 82 MMHG | HEIGHT: 67 IN | SYSTOLIC BLOOD PRESSURE: 120 MMHG | BODY MASS INDEX: 23.07 KG/M2

## 2022-04-29 DIAGNOSIS — Z01.30 BP CHECK: Primary | ICD-10-CM

## 2022-04-29 PROCEDURE — 99207 PR NO CHARGE NURSE ONLY: CPT

## 2022-04-29 NOTE — PROGRESS NOTES
"Ashutosh Velasco is a 41 year old patient who comes in today for a Blood Pressure check.  Initial BP:  /82   Ht 1.697 m (5' 6.81\")   Wt 66.7 kg (147 lb)   BMI 23.15 kg/m        118/84  112/80  Disposition: follow-up as previously indicated by provider  Zeina Garrido, [facility]      "

## 2022-05-02 ENCOUNTER — TELEPHONE (OUTPATIENT)
Dept: TRANSPLANT | Facility: CLINIC | Age: 42
End: 2022-05-02
Payer: COMMERCIAL

## 2022-05-02 NOTE — TELEPHONE ENCOUNTER
Spoke to Ashutosh regarding phase 1 results. His eGFR average is lower and would need both of his kidneys for the rest of his life. Dalton reviewed his CT scan and with his results and eGFR should not move forward. Ashutosh understood and told him to reach out with any further questions.

## 2022-05-24 NOTE — PROGRESS NOTES
Ashutosh is a 42 year old who is being evaluated via a billable video visit.      Patient did E-Check in. Questionnaires blown and patient checked in without being called.    How would you like to obtain your AVS? [place]"  If the video visit is dropped, the invitation should be resent by: Text to cell phone: .  Will anyone else be joining your video visit? No      Video Start Time: 1:15 PM    Assessment & Plan     Generalized anxiety disorder  Doing well. Well controlled. Tolerating medication.  No change in plan.    - citalopram (CELEXA) 20 MG tablet; Take 1 tablet (20 mg) by mouth daily    Lipid screening  Order placed. Will notify of results.   - Lipid panel reflex to direct LDL Fasting; Future    Seasonal allergies  Patient with some worsening of his seasonal allergies currently.  [place] and he notes a lot of pollen in the air.  He currently takes Zyrtec once a day and uses Flonase when he is having flares.  Recommend continue with the Zyrtec and increase to twice daily for the next 3 to 5 days.  Encouraged to continue Flonase during this time as well.  If not improving or if any concerns he will call back.    Patient will also update COVID-vaccine information in his [email or web address] pietro.                 Return in about 1 year (around 6/1/2023) for follow up anxiety.    Veronica Bay MD  [facility] Boykin is a 42 year old who presents for the following health issues     History of Present Illness       Mental Health Follow-up:  Patient presents to follow-up on Anxiety.    Patient's anxiety since last visit has been:  Good  The patient is not having other symptoms associated with anxiety.  Any significant life events: No  Patient is not feeling anxious or having panic attacks.  Patient has no concerns about alcohol or drug use.    He eats 2-3 servings of fruits and vegetables daily.He consumes 0 sweetened beverage(s) daily.He exercises with enough effort to increase  his heart rate 20 to 29 minutes per day.  He exercises with enough effort to increase his heart rate 7 days per week.   He is taking medications regularly.  Today's KVNG-7 Score: 2     Moved to [place] last year. Lived there for 9 months.   Since have moved back to [place].   Had renewal out there for Citalopram.   Moved back in January. Has one month left of his medication.   Reports that things have been good.   No side effects from the medicine.   Takes for anxiety.   No alcohol since 2018 or so.     Asking about lipid panel repeat.     Went through the process to consider kidney donation for friend's child. Reports he was not considered a great candidate. Something to do with GFR in 2016. He is no longer pursuing.     Had 2 Covid Moderna vaccines 5/21.     3 weeks of congestion.  History of allergies in the past. More recently has not been as back until the last few weeks. No fevers. No cough. Little bit of eye itchiness. Taking Zyrtec daily all year around. Has Flonase as well when things are worse. Was [place] the [place]. Lots of pollen in the air.           Review of Systems   CONSTITUTIONAL: NEGATIVE for fever, chills, change in weight  ENT/MOUTH: NEGATIVE for ear, mouth and throat problems  RESP: NEGATIVE for significant cough or SOB  CV: NEGATIVE for chest pain, palpitations or peripheral edema      Objective           Vitals:  No vitals were obtained today due to virtual visit.    Physical Exam   GENERAL: Healthy, alert and no distress  EYES: Eyes grossly normal to inspection.  No discharge or erythema, or obvious scleral/conjunctival abnormalities.  RESP: No audible wheeze, cough, or visible cyanosis.  No visible retractions or increased work of breathing.    SKIN: Visible skin clear. No significant rash, abnormal pigmentation or lesions.  NEURO: Cranial nerves grossly intact.  Mentation and speech appropriate for age.  PSYCH: Mentation appears normal, affect normal/bright, judgement and  insight intact, normal speech and appearance well-groomed.                Video-Visit Details    Type of service:  Video Visit    Video End Time:1:34 PM    Originating Location (pt. Location): Home    Distant Location (provider location):  [facility]     Platform used for Video Visit: [employer]

## 2022-05-31 ASSESSMENT — ANXIETY QUESTIONNAIRES
8. IF YOU CHECKED OFF ANY PROBLEMS, HOW DIFFICULT HAVE THESE MADE IT FOR YOU TO DO YOUR WORK, TAKE CARE OF THINGS AT HOME, OR GET ALONG WITH OTHER PEOPLE?: SOMEWHAT DIFFICULT
GAD7 TOTAL SCORE: 2
7. FEELING AFRAID AS IF SOMETHING AWFUL MIGHT HAPPEN: NOT AT ALL
2. NOT BEING ABLE TO STOP OR CONTROL WORRYING: NOT AT ALL
4. TROUBLE RELAXING: SEVERAL DAYS
GAD7 TOTAL SCORE: 2
GAD7 TOTAL SCORE: 2
7. FEELING AFRAID AS IF SOMETHING AWFUL MIGHT HAPPEN: NOT AT ALL
6. BECOMING EASILY ANNOYED OR IRRITABLE: NOT AT ALL
3. WORRYING TOO MUCH ABOUT DIFFERENT THINGS: SEVERAL DAYS
5. BEING SO RESTLESS THAT IT IS HARD TO SIT STILL: NOT AT ALL
1. FEELING NERVOUS, ANXIOUS, OR ON EDGE: NOT AT ALL

## 2022-06-01 ENCOUNTER — VIRTUAL VISIT (OUTPATIENT)
Dept: FAMILY MEDICINE | Facility: CLINIC | Age: 42
End: 2022-06-01
Payer: COMMERCIAL

## 2022-06-01 DIAGNOSIS — F41.1 GENERALIZED ANXIETY DISORDER: Primary | ICD-10-CM

## 2022-06-01 DIAGNOSIS — Z13.220 LIPID SCREENING: ICD-10-CM

## 2022-06-01 PROCEDURE — 99214 OFFICE O/P EST MOD 30 MIN: CPT | Mod: 95 | Performed by: FAMILY MEDICINE

## 2022-06-01 RX ORDER — CITALOPRAM HYDROBROMIDE 20 MG/1
20 TABLET ORAL DAILY
Qty: 90 TABLET | Refills: 3 | Status: SHIPPED | OUTPATIENT
Start: 2022-06-01 | End: 2023-06-13

## 2022-06-01 ASSESSMENT — ANXIETY QUESTIONNAIRES: GAD7 TOTAL SCORE: 2

## 2022-09-03 ENCOUNTER — HEALTH MAINTENANCE LETTER (OUTPATIENT)
Age: 42
End: 2022-09-03

## 2022-12-14 ENCOUNTER — DOCUMENTATION ONLY (OUTPATIENT)
Dept: TRANSPLANT | Facility: CLINIC | Age: 42
End: 2022-12-14

## 2022-12-14 DIAGNOSIS — Z13.220 LIPID SCREENING: Primary | ICD-10-CM

## 2022-12-14 NOTE — PROGRESS NOTES
This patient has an appointment for lab work but does not have any orders. Please place some future orders as needed.    Thank You,  Maida Walker MLT (ASCP)

## 2022-12-20 ENCOUNTER — LAB (OUTPATIENT)
Dept: LAB | Facility: OTHER | Age: 42
End: 2022-12-20
Payer: COMMERCIAL

## 2022-12-20 DIAGNOSIS — Z13.220 LIPID SCREENING: ICD-10-CM

## 2022-12-20 LAB
CHOLEST SERPL-MCNC: 214 MG/DL
FASTING STATUS PATIENT QL REPORTED: YES
HDLC SERPL-MCNC: 63 MG/DL
LDLC SERPL CALC-MCNC: 134 MG/DL
NONHDLC SERPL-MCNC: 151 MG/DL
TRIGL SERPL-MCNC: 84 MG/DL

## 2022-12-20 PROCEDURE — 80061 LIPID PANEL: CPT

## 2022-12-20 PROCEDURE — 36415 COLL VENOUS BLD VENIPUNCTURE: CPT

## 2023-01-15 ENCOUNTER — HEALTH MAINTENANCE LETTER (OUTPATIENT)
Age: 43
End: 2023-01-15

## 2023-01-18 ENCOUNTER — OFFICE VISIT (OUTPATIENT)
Dept: FAMILY MEDICINE | Facility: CLINIC | Age: 43
End: 2023-01-18
Payer: COMMERCIAL

## 2023-01-18 ENCOUNTER — TELEPHONE (OUTPATIENT)
Dept: FAMILY MEDICINE | Facility: CLINIC | Age: 43
End: 2023-01-18

## 2023-01-18 VITALS
WEIGHT: 154.7 LBS | SYSTOLIC BLOOD PRESSURE: 146 MMHG | HEART RATE: 85 BPM | BODY MASS INDEX: 24.28 KG/M2 | DIASTOLIC BLOOD PRESSURE: 103 MMHG | TEMPERATURE: 99.1 F | OXYGEN SATURATION: 98 % | HEIGHT: 67 IN

## 2023-01-18 DIAGNOSIS — R59.1 LYMPHADENOPATHY: ICD-10-CM

## 2023-01-18 DIAGNOSIS — M26.621 TMJ TENDERNESS, RIGHT: Primary | ICD-10-CM

## 2023-01-18 LAB
BASOPHILS # BLD AUTO: 0 10E3/UL (ref 0–0.2)
BASOPHILS NFR BLD AUTO: 1 %
EOSINOPHIL # BLD AUTO: 0 10E3/UL (ref 0–0.7)
EOSINOPHIL NFR BLD AUTO: 1 %
ERYTHROCYTE [DISTWIDTH] IN BLOOD BY AUTOMATED COUNT: 11.1 % (ref 10–15)
HCT VFR BLD AUTO: 43 % (ref 40–53)
HGB BLD-MCNC: 15 G/DL (ref 13.3–17.7)
IMM GRANULOCYTES # BLD: 0 10E3/UL
IMM GRANULOCYTES NFR BLD: 0 %
LYMPHOCYTES # BLD AUTO: 1.5 10E3/UL (ref 0.8–5.3)
LYMPHOCYTES NFR BLD AUTO: 21 %
MCH RBC QN AUTO: 32 PG (ref 26.5–33)
MCHC RBC AUTO-ENTMCNC: 34.9 G/DL (ref 31.5–36.5)
MCV RBC AUTO: 92 FL (ref 78–100)
MONOCYTES # BLD AUTO: 0.4 10E3/UL (ref 0–1.3)
MONOCYTES NFR BLD AUTO: 6 %
NEUTROPHILS # BLD AUTO: 5.3 10E3/UL (ref 1.6–8.3)
NEUTROPHILS NFR BLD AUTO: 72 %
PLATELET # BLD AUTO: 287 10E3/UL (ref 150–450)
RBC # BLD AUTO: 4.69 10E6/UL (ref 4.4–5.9)
WBC # BLD AUTO: 7.3 10E3/UL (ref 4–11)

## 2023-01-18 PROCEDURE — 36415 COLL VENOUS BLD VENIPUNCTURE: CPT | Performed by: FAMILY MEDICINE

## 2023-01-18 PROCEDURE — 99214 OFFICE O/P EST MOD 30 MIN: CPT | Performed by: FAMILY MEDICINE

## 2023-01-18 PROCEDURE — 85025 COMPLETE CBC W/AUTO DIFF WBC: CPT | Performed by: FAMILY MEDICINE

## 2023-01-18 RX ORDER — IBUPROFEN 600 MG/1
600 TABLET, FILM COATED ORAL 3 TIMES DAILY PRN
Qty: 60 TABLET | Refills: 0 | Status: SHIPPED | OUTPATIENT
Start: 2023-01-18 | End: 2023-06-13

## 2023-01-18 ASSESSMENT — PAIN SCALES - GENERAL: PAINLEVEL: NO PAIN (0)

## 2023-01-18 NOTE — PROGRESS NOTES
Assessment & Plan     TMJ tenderness, right  Already has a Mouth guard from dentist. Keep scheduled appointment with his dental team  - ibuprofen (ADVIL/MOTRIN) 600 MG tablet; Take 1 tablet (600 mg) by mouth 3 times daily as needed for moderate pain (4-6)  - Physical Therapy Referral; Future    Lymphadenopathy  Reassurance given of ultrasound report and within normal limits cbc  Follow up prn  - CBC with platelets and differential; Future  - US Head Neck Soft Tissue; Future  - CBC with platelets and differential        No follow-ups on file.    Shereen Contreras MD  [facility] KEISHA Boykin is a 42 year old, presenting for the following health issues:  Neck Pain      History of Present Illness       Reason for visit:  Neck/jaw pain amd white lines across fingernails  Symptom onset:  More than a month  Symptoms include:  Neck/jaw pain amd white lines across fingernails  Symptom intensity:  Moderate  Symptom progression:  Staying the same  Had these symptoms before:  No    He eats 2-3 servings of fruits and vegetables daily.He consumes 0 sweetened beverage(s) daily.He exercises with enough effort to increase his heart rate 20 to 29 minutes per day.  He exercises with enough effort to increase his heart rate 5 days per week.   He is taking medications regularly.       Pain History:  When did you first notice your pain? - Acute Pain   Have you seen anyone else for your pain? No  Where in your body do you have pain? Neck Pain  Onset/Duration: 3 months ago   Description:   Location: right side jaw and upper neck   Radiation: none  Intensity: mild  Progression of Symptoms:  same  Accompanying Signs & Symptoms:  Burning, tingling, prickly sensation in arm(s): No  Numbness in arm(s): No  Weakness in arm(s):  No  Fever: No  Headache: No  Nausea and/or vomiting: No  History:   Trauma: No  Previous neck pain: No  Previous surgery or injections: No  Previous Imaging (MRI,X ray): No  Precipitating  "or alleviating factors: None  Does movement impact the pain:  YES, makes pain worse. Turning, certain movements aggravate   Therapies tried and outcome: nothing        Review of Systems   Constitutional, HEENT, cardiovascular, pulmonary, GI, , musculoskeletal, neuro, skin, endocrine and psych systems are negative, except as otherwise noted.      Objective    BP (!) 146/103 (BP Location: Left arm, Patient Position: Chair, Cuff Size: Adult Regular)   Pulse 85   Temp 99.1  F (37.3  C) (Temporal)   Ht 1.698 m (5' 6.85\")   Wt 70.2 kg (154 lb 11.2 oz)   SpO2 98%   BMI 24.34 kg/m    Body mass index is 24.34 kg/m .  Physical Exam   GENERAL: healthy, alert and no distress  EYES: Eyes grossly normal to inspection, PERRL and conjunctivae and sclerae normal  HENT: ear canals and TM's normal, nose and mouth without ulcers or lesions  HENT: normal cephalic/atraumatic, ear canals and TM's normal, nose and mouth without ulcers or lesions, oropharynx clear, oral mucous membranes moist and TMJ right Jaw with tenderness  NECK:Right  anterior cervical adenopathy  RESP: lungs clear to auscultation - no rales, rhonchi or wheezes  CV: regular rate and rhythm, normal S1 S2, no S3 or S4, no murmur, click or rub, no peripheral edema and peripheral pulses strong  ABDOMEN: soft, nontender, no hepatosplenomegaly, no masses and bowel sounds normal  MS: no gross musculoskeletal defects noted, no edema    Results for orders placed or performed in visit on 01/18/23   CBC with platelets and differential     Status: None   Result Value Ref Range    WBC Count 7.3 4.0 - 11.0 10e3/uL    RBC Count 4.69 4.40 - 5.90 10e6/uL    Hemoglobin 15.0 13.3 - 17.7 g/dL    Hematocrit 43.0 40.0 - 53.0 %    MCV 92 78 - 100 fL    MCH 32.0 26.5 - 33.0 pg    MCHC 34.9 31.5 - 36.5 g/dL    RDW 11.1 10.0 - 15.0 %    Platelet Count 287 150 - 450 10e3/uL    % Neutrophils 72 %    % Lymphocytes 21 %    % Monocytes 6 %    % Eosinophils 1 %    % Basophils 1 %    % Immature " Granulocytes 0 %    Absolute Neutrophils 5.3 1.6 - 8.3 10e3/uL    Absolute Lymphocytes 1.5 0.8 - 5.3 10e3/uL    Absolute Monocytes 0.4 0.0 - 1.3 10e3/uL    Absolute Eosinophils 0.0 0.0 - 0.7 10e3/uL    Absolute Basophils 0.0 0.0 - 0.2 10e3/uL    Absolute Immature Granulocytes 0.0 <=0.4 10e3/uL   CBC with platelets and differential     Status: None    Narrative    The following orders were created for panel order CBC with platelets and differential.  Procedure                               Abnormality         Status                     ---------                               -----------         ------                     CBC with platelets and d...[[id number]]                      Final result                 Please view results for these tests on the individual orders.

## 2023-01-18 NOTE — TELEPHONE ENCOUNTER
Patient returned call and spoke with alternate RN at [facility].  He states he can now make that appointment today with Dr. Sabillon.  Appointment was made by alternate RN and she then notified me of appointment.  Coleen SOTELO RN

## 2023-01-18 NOTE — TELEPHONE ENCOUNTER
Patient requesting sooner appointment with his PCP if possible.  He states soonest available Jan 30 with alternate provider [place].    He reports he has 2 issues:    1.  Onset approx 3 months ago pain behind right into lower jaw and into neck.  States pinpoint bump on jawline behind ear lobe area.   He states notices it coto so when turning certain angles or lifts head when getting out of bed.  Denies any illness;  No other symptoms.     2.  Today he noticed he has white horizontal lines on all finger nails (except thumbs) about 1/3 up from cuticle area.    Did offer appointment today at his [facility];  He stated ride-shared so couldn't make it.  Coleen Townsend RN

## 2023-01-25 ENCOUNTER — ANCILLARY PROCEDURE (OUTPATIENT)
Dept: ULTRASOUND IMAGING | Facility: CLINIC | Age: 43
End: 2023-01-25
Attending: FAMILY MEDICINE
Payer: COMMERCIAL

## 2023-01-25 DIAGNOSIS — R59.1 LYMPHADENOPATHY: ICD-10-CM

## 2023-01-25 PROCEDURE — 76536 US EXAM OF HEAD AND NECK: CPT

## 2023-06-10 ASSESSMENT — ENCOUNTER SYMPTOMS
DYSURIA: 0
SHORTNESS OF BREATH: 0
NAUSEA: 0
WEAKNESS: 0
EYE PAIN: 0
HEMATOCHEZIA: 0
SORE THROAT: 0
MYALGIAS: 0
CONSTIPATION: 0
ABDOMINAL PAIN: 0
HEADACHES: 0
ARTHRALGIAS: 1
HEARTBURN: 0
PARESTHESIAS: 0
COUGH: 0
HEMATURIA: 0
NERVOUS/ANXIOUS: 0
PALPITATIONS: 0
DIZZINESS: 0
CHILLS: 0
FREQUENCY: 0
JOINT SWELLING: 0
FEVER: 0
DIARRHEA: 0

## 2023-06-10 ASSESSMENT — ANXIETY QUESTIONNAIRES
8. IF YOU CHECKED OFF ANY PROBLEMS, HOW DIFFICULT HAVE THESE MADE IT FOR YOU TO DO YOUR WORK, TAKE CARE OF THINGS AT HOME, OR GET ALONG WITH OTHER PEOPLE?: NOT DIFFICULT AT ALL
1. FEELING NERVOUS, ANXIOUS, OR ON EDGE: NOT AT ALL
GAD7 TOTAL SCORE: 0
7. FEELING AFRAID AS IF SOMETHING AWFUL MIGHT HAPPEN: NOT AT ALL
4. TROUBLE RELAXING: NOT AT ALL
5. BEING SO RESTLESS THAT IT IS HARD TO SIT STILL: NOT AT ALL
6. BECOMING EASILY ANNOYED OR IRRITABLE: NOT AT ALL
7. FEELING AFRAID AS IF SOMETHING AWFUL MIGHT HAPPEN: NOT AT ALL
IF YOU CHECKED OFF ANY PROBLEMS ON THIS QUESTIONNAIRE, HOW DIFFICULT HAVE THESE PROBLEMS MADE IT FOR YOU TO DO YOUR WORK, TAKE CARE OF THINGS AT HOME, OR GET ALONG WITH OTHER PEOPLE: NOT DIFFICULT AT ALL
GAD7 TOTAL SCORE: 0
3. WORRYING TOO MUCH ABOUT DIFFERENT THINGS: NOT AT ALL
2. NOT BEING ABLE TO STOP OR CONTROL WORRYING: NOT AT ALL

## 2023-06-13 ENCOUNTER — OFFICE VISIT (OUTPATIENT)
Dept: FAMILY MEDICINE | Facility: OTHER | Age: 43
End: 2023-06-13
Payer: COMMERCIAL

## 2023-06-13 VITALS
RESPIRATION RATE: 16 BRPM | SYSTOLIC BLOOD PRESSURE: 118 MMHG | HEART RATE: 74 BPM | DIASTOLIC BLOOD PRESSURE: 70 MMHG | WEIGHT: 161.31 LBS | OXYGEN SATURATION: 99 % | HEIGHT: 67 IN | TEMPERATURE: 97.5 F | BODY MASS INDEX: 25.32 KG/M2

## 2023-06-13 DIAGNOSIS — F41.1 GENERALIZED ANXIETY DISORDER: Primary | ICD-10-CM

## 2023-06-13 PROCEDURE — 99213 OFFICE O/P EST LOW 20 MIN: CPT | Performed by: PHYSICIAN ASSISTANT

## 2023-06-13 RX ORDER — CITALOPRAM HYDROBROMIDE 20 MG/1
20 TABLET ORAL DAILY
Qty: 90 TABLET | Refills: 3 | Status: SHIPPED | OUTPATIENT
Start: 2023-06-13 | End: 2024-04-19

## 2023-06-13 ASSESSMENT — ENCOUNTER SYMPTOMS
DIZZINESS: 0
DIARRHEA: 0
WEAKNESS: 0
EYE PAIN: 0
FREQUENCY: 0
FEVER: 0
JOINT SWELLING: 0
HEARTBURN: 0
SORE THROAT: 0
NERVOUS/ANXIOUS: 0
CHILLS: 0
SHORTNESS OF BREATH: 0
NAUSEA: 0
CONSTIPATION: 0
DYSURIA: 0
MYALGIAS: 0
PARESTHESIAS: 0
HEADACHES: 0
PALPITATIONS: 0
HEMATOCHEZIA: 0
HEMATURIA: 0
COUGH: 0
ABDOMINAL PAIN: 0

## 2023-06-13 ASSESSMENT — PAIN SCALES - GENERAL: PAINLEVEL: NO PAIN (0)

## 2023-06-13 NOTE — PROGRESS NOTES
Assessment & Plan     ICD-10-CM    1. Generalized anxiety disorder  F41.1 citalopram (CELEXA) 20 MG tablet        - Patient declined physical, just wants med check  - Reports been on this dose of Celexa for about 12 years, going well, no side effects, wishes to continue     Will consider stable   - Reviewed medication use and side effects, refilled       Review of the result(s) of each unique test - PHQ9 & GAD7   Diagnosis or treatment significantly limited by social determinants of health - None     10 minutes spent on the date of the encounter doing chart review, history and exam, documentation and further activities as noted above    The patient indicates understanding of these issues and agrees with the plan.    Follow up: 1 year or PRN     YONI Hoskins [facility] VINCENT Boykin is a 43 year old, presenting for the following health issues:  Recheck Medication        6/13/2023     9:41 AM   Additional Questions   Roomed by Mana     Healthy Habits:    Getting at least 3 servings of Calcium per day:  Yes    Bi-annual eye exam:  Yes    Dental care twice a year:  Yes    Sleep apnea or symptoms of sleep apnea:  None    Diet:  Regular (no restrictions) and Breakfast skipped    Frequency of exercise:  2-3 days/week    Duration of exercise:  15-30 minutes    Taking medications regularly:  Yes    Medication side effects:  None    PHQ-2 Total Score:    Additional concerns today:  No       Anxiety Follow-Up    How are you doing with your anxiety since your last visit? Improved     Are you having other symptoms that might be associated with anxiety? No    Have you had a significant life event? No     Are you feeling depressed? No    Do you have any concerns with your use of alcohol or other drugs? No    Social History     Tobacco Use     Smoking status: Former     Packs/day: 1.00     Years: 10.00     Pack years: 10.00     Types: Cigarettes, Dip, chew, snus or snuff      Quit date: 8/1/2019     Years since quitting: 3.8     Smokeless tobacco: Former     Types: Chew     Tobacco comments:     using nicotine free chew   Vaping Use     Vaping status: Never Used   Substance Use Topics     Alcohol use: No     Comment: quit in November 2017     Drug use: No         10/20/2020     1:52 PM 5/31/2022     2:19 PM 6/10/2023    10:08 AM   KVNG-7 SCORE   Total Score 2 (minimal anxiety) 2 (minimal anxiety) 0 (minimal anxiety)   Total Score 2 2 0         10/17/2018    11:47 AM 11/1/2019     2:14 PM 10/20/2020     1:51 PM   PHQ   PHQ-9 Total Score 3 2 0   Q9: Thoughts of better off dead/self-harm past 2 weeks Not at all Not at all Not at all           How many servings of fruits and vegetables do you eat daily?  2-3    On average, how many sweetened beverages do you drink each day (Examples: soda, juice, sweet tea, etc.  Do NOT count diet or artificially sweetened beverages)?   0    How many days per week do you exercise enough to make your heart beat faster? 5    How many minutes a day do you exercise enough to make your heart beat faster? 20 - 29    How many days per week do you miss taking your medication? 0        Review of Systems   Constitutional: Negative for chills and fever.   HENT: Negative for congestion, ear pain, hearing loss and sore throat.    Eyes: Negative for pain and visual disturbance.   Respiratory: Negative for cough and shortness of breath.    Cardiovascular: Negative for chest pain, palpitations and peripheral edema.   Gastrointestinal: Negative for abdominal pain, constipation, diarrhea, heartburn, hematochezia and nausea.   Genitourinary: Negative for dysuria, frequency, genital sores, hematuria, impotence, penile discharge and urgency.   Musculoskeletal: Negative for joint swelling and myalgias.   Skin: Negative for rash.   Neurological: Negative for dizziness, weakness, headaches and paresthesias.   Psychiatric/Behavioral: Negative for mood changes. The patient is not  "nervous/anxious.         Objective    /70   Pulse 74   Temp 97.5  F (36.4  C) (Temporal)   Resp 16   Ht 1.708 m (5' 7.25\")   Wt 73.2 kg (161 lb 5 oz)   SpO2 99%   BMI 25.08 kg/m    Body mass index is 25.08 kg/m .  Physical Exam   GENERAL APPEARANCE: healthy, alert and no distress  EYES: Eyes grossly normal to inspection, PERRLA, conjunctivae and sclerae without injection or discharge, EOM intact   RESP: Lungs clear to auscultation - no rales, rhonchi or wheezes    CV: Regular rates and rhythm, normal S1 S2, no S3 or S4, no murmur, click or rub, no peripheral edema and peripheral pulses strong and symmetric bilaterally   MS: No musculoskeletal defects are noted and gait is age appropriate without ataxia   SKIN: No suspicious lesions or rashes, hydration status appears adeuqate with normal skin turgor   PSYCH: Alert and oriented x3; speech- coherent , normal rate and volume; able to articulate logical thoughts, able to abstract reason, no tangential thoughts, no hallucinations or delusions, mentation appears normal, Mood is euthymic. Affect is appropriate for this mood state and bright. Thought content is free of suicidal ideation, hallucinations, and delusions. Dress is adequate and upkept. Eye contact is good during conversation.       Diagnostics: none         "

## 2024-02-17 ENCOUNTER — HEALTH MAINTENANCE LETTER (OUTPATIENT)
Age: 44
End: 2024-02-17

## 2024-04-12 SDOH — HEALTH STABILITY: PHYSICAL HEALTH: ON AVERAGE, HOW MANY DAYS PER WEEK DO YOU ENGAGE IN MODERATE TO STRENUOUS EXERCISE (LIKE A BRISK WALK)?: 2 DAYS

## 2024-04-12 SDOH — HEALTH STABILITY: PHYSICAL HEALTH: ON AVERAGE, HOW MANY MINUTES DO YOU ENGAGE IN EXERCISE AT THIS LEVEL?: 20 MIN

## 2024-04-12 ASSESSMENT — SOCIAL DETERMINANTS OF HEALTH (SDOH): HOW OFTEN DO YOU GET TOGETHER WITH FRIENDS OR RELATIVES?: ONCE A WEEK

## 2024-04-18 ASSESSMENT — ANXIETY QUESTIONNAIRES
GAD7 TOTAL SCORE: 2
IF YOU CHECKED OFF ANY PROBLEMS ON THIS QUESTIONNAIRE, HOW DIFFICULT HAVE THESE PROBLEMS MADE IT FOR YOU TO DO YOUR WORK, TAKE CARE OF THINGS AT HOME, OR GET ALONG WITH OTHER PEOPLE: SOMEWHAT DIFFICULT
5. BEING SO RESTLESS THAT IT IS HARD TO SIT STILL: NOT AT ALL
7. FEELING AFRAID AS IF SOMETHING AWFUL MIGHT HAPPEN: NOT AT ALL
2. NOT BEING ABLE TO STOP OR CONTROL WORRYING: NOT AT ALL
7. FEELING AFRAID AS IF SOMETHING AWFUL MIGHT HAPPEN: NOT AT ALL
6. BECOMING EASILY ANNOYED OR IRRITABLE: NOT AT ALL
4. TROUBLE RELAXING: NOT AT ALL
1. FEELING NERVOUS, ANXIOUS, OR ON EDGE: SEVERAL DAYS
8. IF YOU CHECKED OFF ANY PROBLEMS, HOW DIFFICULT HAVE THESE MADE IT FOR YOU TO DO YOUR WORK, TAKE CARE OF THINGS AT HOME, OR GET ALONG WITH OTHER PEOPLE?: SOMEWHAT DIFFICULT
GAD7 TOTAL SCORE: 2
3. WORRYING TOO MUCH ABOUT DIFFERENT THINGS: SEVERAL DAYS

## 2024-04-19 ENCOUNTER — OFFICE VISIT (OUTPATIENT)
Dept: FAMILY MEDICINE | Facility: CLINIC | Age: 44
End: 2024-04-19
Payer: COMMERCIAL

## 2024-04-19 VITALS
DIASTOLIC BLOOD PRESSURE: 78 MMHG | SYSTOLIC BLOOD PRESSURE: 130 MMHG | TEMPERATURE: 97.7 F | BODY MASS INDEX: 24.01 KG/M2 | HEIGHT: 67 IN | OXYGEN SATURATION: 98 % | WEIGHT: 153 LBS | HEART RATE: 66 BPM

## 2024-04-19 DIAGNOSIS — F41.1 GENERALIZED ANXIETY DISORDER: ICD-10-CM

## 2024-04-19 DIAGNOSIS — Z00.00 ROUTINE GENERAL MEDICAL EXAMINATION AT A HEALTH CARE FACILITY: Primary | ICD-10-CM

## 2024-04-19 DIAGNOSIS — Z13.220 LIPID SCREENING: ICD-10-CM

## 2024-04-19 DIAGNOSIS — Z11.59 NEED FOR HEPATITIS C SCREENING TEST: ICD-10-CM

## 2024-04-19 PROBLEM — Z72.0 TOBACCO USE: Status: RESOLVED | Noted: 2018-10-17 | Resolved: 2024-04-19

## 2024-04-19 PROCEDURE — 86803 HEPATITIS C AB TEST: CPT | Performed by: FAMILY MEDICINE

## 2024-04-19 PROCEDURE — 80061 LIPID PANEL: CPT | Performed by: FAMILY MEDICINE

## 2024-04-19 PROCEDURE — 36415 COLL VENOUS BLD VENIPUNCTURE: CPT | Performed by: FAMILY MEDICINE

## 2024-04-19 PROCEDURE — 99396 PREV VISIT EST AGE 40-64: CPT | Performed by: FAMILY MEDICINE

## 2024-04-19 RX ORDER — CITALOPRAM HYDROBROMIDE 20 MG/1
20 TABLET ORAL DAILY
Qty: 90 TABLET | Refills: 3 | Status: SHIPPED | OUTPATIENT
Start: 2024-04-19

## 2024-04-19 ASSESSMENT — PAIN SCALES - GENERAL: PAINLEVEL: NO PAIN (0)

## 2024-04-19 NOTE — PROGRESS NOTES
Preventive Care Visit  [facility] KEISHA Bay MD, Family Medicine  Apr 19, 2024      Assessment & Plan     Routine general medical examination at a health care facility  Doing well. Well controlled. Tolerating medication.  No change in plan.      Generalized anxiety disorder  Doing well. Well controlled. Tolerating medication.  No change in plan.    - citalopram (CELEXA) 20 MG tablet; Take 1 tablet (20 mg) by mouth daily    Need for hepatitis C screening test  Discussed. Agrees to proceed.   - Hepatitis C Screen Reflex to HCV RNA Quant and Genotype; Future  - Hepatitis C Screen Reflex to HCV RNA Quant and Genotype    Lipid screening  Will notify of results.   - Lipid panel reflex to direct LDL Fasting; Future  - Lipid panel reflex to direct LDL Fasting              Counseling  Appropriate preventive services were discussed with this patient, including applicable screening as appropriate for fall prevention, nutrition, physical activity, Tobacco-use cessation, weight loss and cognition.  Checklist reviewing preventive services available has been given to the patient.  Reviewed patient's diet, addressing concerns and/or questions.   He is at risk for lack of exercise and has been provided with information to increase physical activity for the benefit of his well-being.   He is at risk for psychosocial distress and has been provided with information to reduce risk.           Kelley Boykin is a 43 year old, presenting for the following:  Physical        4/19/2024     1:39 PM   Additional Questions   Roomed by Ariana JI CMA   Accompanied by self         4/19/2024     1:39 PM   Patient Reported Additional Medications   Patient reports taking the following new medications multivitamin, zyrtec        Health Care Directive  Patient does not have a Health Care Directive or Living Will: Discussed advance care planning with patient; however, patient declined at this time.    HPI          Anxiety    How are you doing with your anxiety since your last visit? No change  Are you having other symptoms that might be associated with anxiety? Yes:  some anxiety with sick pet  Have you had a significant life event? OTHER: dog is ill. Has worry over this.    Are you feeling depressed? No  Do you have any concerns with your use of alcohol or other drugs? No    Social History     Tobacco Use    Smoking status: Former     Current packs/day: 0.00     Average packs/day: 1 pack/day for 10.0 years (10.0 ttl pk-yrs)     Types: Cigarettes     Start date: 2009     Quit date: 2019     Years since quittin.7    Smokeless tobacco: Former     Types: Chew    Tobacco comments:     using nicotine free chew   Vaping Use    Vaping status: Never Used   Substance Use Topics    Alcohol use: No     Comment: quit in 2017    Drug use: No         2022     2:19 PM 6/10/2023    10:08 AM 2024     7:28 PM   KVNG-7 SCORE   Total Score 2 (minimal anxiety) 0 (minimal anxiety) 2 (minimal anxiety)   Total Score 2 0 2         10/17/2018    11:47 AM 2019     2:14 PM 10/20/2020     1:51 PM   PHQ   PHQ-9 Total Score 3 2 0   Q9: Thoughts of better off dead/self-harm past 2 weeks Not at all Not at all Not at all           2024   General Health   How would you rate your overall physical health? Good   Feel stress (tense, anxious, or unable to sleep) Only a little   (!) STRESS CONCERN      2024   Nutrition   Three or more servings of calcium each day? Yes   Diet: Breakfast skipped   How many servings of fruit and vegetables per day? (!) 2-3   How many sweetened beverages each day? 0-1         2024   Exercise   Days per week of moderate/strenous exercise 2 days   Average minutes spent exercising at this level 20 min   (!) EXERCISE CONCERN      2024   Social Factors   Frequency of gathering with friends or relatives Once a week   Worry food won't last until get money to buy more No   Food not last or not have  enough money for food? No   Do you have housing?  Yes   Are you worried about losing your housing? No   Lack of transportation? No   Unable to get utilities (heat,electricity)? No         2024   Dental   Dentist two times every year? Yes         2024   TB Screening   Were you born outside of the [place]? No         Today's PHQ-2 Score:       2024     7:28 PM   PHQ-2 (  [employer])   Q1: Little interest or pleasure in doing things 0   Q2: Feeling down, depressed or hopeless 0   PHQ-2 Score 0   Q1: Little interest or pleasure in doing things Not at all   Q2: Feeling down, depressed or hopeless Not at all   PHQ-2 Score 0           2024   Substance Use   Alcohol more than 3/day or more than 7/wk Not Applicable   Do you use any other substances recreationally? No     Social History     Tobacco Use    Smoking status: Former     Current packs/day: 0.00     Average packs/day: 1 pack/day for 10.0 years (10.0 ttl pk-yrs)     Types: Cigarettes     Start date: 2009     Quit date: 2019     Years since quittin.7    Smokeless tobacco: Former     Types: Chew    Tobacco comments:     using nicotine free chew   Vaping Use    Vaping status: Never Used   Substance Use Topics    Alcohol use: No     Comment: quit in 2017    Drug use: No           2024   STI Screening   New sexual partner(s) since last STI/HIV test? No   ASCVD Risk   The 10-year ASCVD risk score (Yadiel GARZON, et al., 2019) is: 1.1%    Values used to calculate the score:      Age: 43 years      Sex: Male      Is Non- : No      Diabetic: No      Tobacco smoker: No      Systolic Blood Pressure: 130 mmHg      Is BP treated: No      HDL Cholesterol: 63 mg/dL      Total Cholesterol: 214 mg/dL        2024   Contraception/Family Planning   Questions about contraception or family planning No        Reviewed and updated as needed this visit by Provider                    BP Readings from Last 3 Encounters:  "  04/19/24 130/78   06/13/23 118/70   01/18/23 (!) 146/103    Wt Readings from Last 3 Encounters:   04/19/24 69.4 kg (153 lb)   06/13/23 73.2 kg (161 lb 5 oz)   01/18/23 70.2 kg (154 lb 11.2 oz)                         Objective    Exam  /78   Pulse 66   Temp 97.7  F (36.5  C) (Temporal)   Ht 1.708 m (5' 7.25\")   Wt 69.4 kg (153 lb)   SpO2 98%   BMI 23.79 kg/m     Estimated body mass index is 23.79 kg/m  as calculated from the following:    Height as of this encounter: 1.708 m (5' 7.25\").    Weight as of this encounter: 69.4 kg (153 lb).    Physical Exam  GENERAL: alert and no distress  EYES: Eyes grossly normal to inspection, PERRL and conjunctivae and sclerae normal  HENT: ear canals and TM's normal, nose and mouth without ulcers or lesions  NECK: no adenopathy, no asymmetry, masses, or scars  RESP: lungs clear to auscultation - no rales, rhonchi or wheezes  CV: regular rate and rhythm, normal S1 S2, no S3 or S4, no murmur, click or rub, no peripheral edema  ABDOMEN: soft, nontender, no hepatosplenomegaly, no masses and bowel sounds normal  MS: no gross musculoskeletal defects noted, no edema  SKIN: no suspicious lesions or rashes  NEURO: Normal strength and tone, mentation intact and speech normal  PSYCH: mentation appears normal, affect normal/bright        Signed Electronically by: Veronica Bay MD    Answers submitted by the patient for this visit:  KVNG-7 (Submitted on 4/18/2024)  KVNG 7 TOTAL SCORE: 2    "

## 2024-04-19 NOTE — PATIENT INSTRUCTIONS
Preventive Care Advice   This is general advice given by our system to help you stay healthy. However, your care team may have specific advice just for you. Please talk to your care team about your preventive care needs.  Nutrition  Eat 5 or more servings of fruits and vegetables each day.  Try wheat bread, brown rice and whole grain pasta (instead of white bread, rice, and pasta).  Get enough calcium and vitamin D. Check the label on foods and aim for 100% of the RDA (recommended daily allowance).  Lifestyle  Exercise at least 150 minutes each week   (30 minutes a day, 5 days a week).  Do muscle strengthening activities 2 days a week. These help control your weight and prevent disease.  No smoking.  Wear sunscreen to prevent skin cancer.  Have a dental exam and cleaning every 6 months.  Yearly exams  See your health care team every year to talk about:  Any changes in your health.  Any medicines your care team has prescribed.  Preventive care, family planning, and ways to prevent chronic diseases.  Shots (vaccines)   HPV shots (up to age 26), if you've never had them before.  Hepatitis B shots (up to age 59), if you've never had them before.  COVID-19 shot: Get this shot when it's due.  Flu shot: Get a flu shot every year.  Tetanus shot: Get a tetanus shot every 10 years.  Pneumococcal, hepatitis A, and RSV shots: Ask your care team if you need these based on your risk.  Shingles shot (for age 50 and up).  General health tests  Diabetes screening:  Starting at age 35, Get screened for diabetes at least every 3 years.  If you are younger than age 35, ask your care team if you should be screened for diabetes.  Cholesterol test: At age 39, start having a cholesterol test every 5 years, or more often if advised.  Bone density scan (DEXA): At age 50, ask your care team if you should have this scan for osteoporosis (brittle bones).  Hepatitis C: Get tested at least once in your life.  STIs (sexually transmitted  infections)  Before age 24: Ask your care team if you should be screened for STIs.  After age 24: Get screened for STIs if you're at risk. You are at risk for STIs (including HIV) if:  You are sexually active with more than one person.  You don't use condoms every time.  You or a partner was diagnosed with a sexually transmitted infection.  If you are at risk for HIV, ask about PrEP medicine to prevent HIV.  Get tested for HIV at least once in your life, whether you are at risk for HIV or not.  Cancer screening tests  Cervical cancer screening: If you have a cervix, begin getting regular cervical cancer screening tests at age 21. Most people who have regular screenings with normal results can stop after age 65. Talk about this with your provider.  Breast cancer scan (mammogram): If you've ever had breasts, begin having regular mammograms starting at age 40. This is a scan to check for breast cancer.  Colon cancer screening: It is important to start screening for colon cancer at age 45.  Have a colonoscopy test every 10 years (or more often if you're at risk) Or, ask your provider about stool tests like a FIT test every year or Cologuard test every 3 years.  To learn more about your testing options, visit: https://[email or web address]/536969.pdf.  For help making a decision, visit: https://bit.ly/qu76325.  Prostate cancer screening test: If you have a prostate and are age 55 to 69, ask your provider if you would benefit from a yearly prostate cancer screening test.  Lung cancer screening: If you are a current or former smoker age 50 to 80, ask your care team if ongoing lung cancer screenings are right for you.  For informational purposes only. Not to replace the advice of your health care provider. Copyright   2023 [employer]. All rights reserved. Clinically reviewed by the [facility]. [employer] 677482 - REV 01/24.    Learning About Stress  What is stress?     Stress is your  body's response to a hard situation. Your body can have a physical, emotional, or mental response. Stress is a fact of life for most people, and it affects everyone differently. What causes stress for you may not be stressful for someone else.  A lot of things can cause stress. You may feel stress when you go on a job interview, take a test, or run a race. This kind of short-term stress is normal and even useful. It can help you if you need to work hard or react quickly. For example, stress can help you finish an important job on time.  Long-term stress is caused by ongoing stressful situations or events. Examples of long-term stress include long-term health problems, ongoing problems at work, or conflicts in your family. Long-term stress can harm your health.  How does stress affect your health?  When you are stressed, your body responds as though you are in danger. It makes hormones that speed up your heart, make you breathe faster, and give you a burst of energy. This is called the fight-or-flight stress response. If the stress is over quickly, your body goes back to normal and no harm is done.  But if stress happens too often or lasts too long, it can have bad effects. Long-term stress can make you more likely to get sick, and it can make symptoms of some diseases worse. If you tense up when you are stressed, you may develop neck, shoulder, or low back pain. Stress is linked to high blood pressure and heart disease.  Stress also harms your emotional health. It can make you amin, tense, or depressed. Your relationships may suffer, and you may not do well at work or school.  What can you do to manage stress?  You can try these things to help manage stress:   Do something active. Exercise or activity can help reduce stress. Walking is a great way to get started. Even everyday activities such as housecleaning or yard work can help.  Try yoga or rossy chi. These techniques combine exercise and meditation. You may need  some training at first to learn them.  Do something you enjoy. For example, listen to music or go to a movie. Practice your hobby or do volunteer work.  Meditate. This can help you relax, because you are not worrying about what happened before or what may happen in the future.  Do guided imagery. Imagine yourself in any setting that helps you feel calm. You can use online videos, books, or a teacher to guide you.  Do breathing exercises. For example:  From a standing position, bend forward from the waist with your knees slightly bent. Let your arms dangle close to the floor.  Breathe in slowly and deeply as you return to a standing position. Roll up slowly and lift your head last.  Hold your breath for just a few seconds in the standing position.  Breathe out slowly and bend forward from the waist.  Let your feelings out. Talk, laugh, cry, and express anger when you need to. Talking with supportive friends or family, a counselor, or a chitra leader about your feelings is a healthy way to relieve stress. Avoid discussing your feelings with people who make you feel worse.  Write. It may help to write about things that are bothering you. This helps you find out how much stress you feel and what is causing it. When you know this, you can find better ways to cope.  What can you do to prevent stress?  You might try some of these things to help prevent stress:  Manage your time. This helps you find time to do the things you want and need to do.  Get enough sleep. Your body recovers from the stresses of the day while you are sleeping.  Get support. Your family, friends, and community can make a difference in how you experience stress.  Limit your news feed. Avoid or limit time on social media or news that may make you feel stressed.  Do something active. Exercise or activity can help reduce stress. Walking is a great way to get started.  Where can you learn more?  Go to https://www.healthwise.net/patiented  Enter N032 in the  "search box to learn more about \"Learning About Stress.\"  Current as of: October 24, 2023               Content Version: 14.0    4894-7219 [employer].   Care instructions adapted under license by your healthcare professional. If you have questions about a medical condition or this instruction, always ask your healthcare professional. [employer] disclaims any warranty or liability for your use of this information.      "

## 2024-04-21 LAB
CHOLEST SERPL-MCNC: 218 MG/DL
FASTING STATUS PATIENT QL REPORTED: ABNORMAL
HCV AB SERPL QL IA: NONREACTIVE
HDLC SERPL-MCNC: 49 MG/DL
LDLC SERPL CALC-MCNC: 159 MG/DL
NONHDLC SERPL-MCNC: 169 MG/DL
TRIGL SERPL-MCNC: 49 MG/DL

## 2024-06-30 ASSESSMENT — PATIENT HEALTH QUESTIONNAIRE - PHQ9
10. IF YOU CHECKED OFF ANY PROBLEMS, HOW DIFFICULT HAVE THESE PROBLEMS MADE IT FOR YOU TO DO YOUR WORK, TAKE CARE OF THINGS AT HOME, OR GET ALONG WITH OTHER PEOPLE: SOMEWHAT DIFFICULT
SUM OF ALL RESPONSES TO PHQ QUESTIONS 1-9: 3
SUM OF ALL RESPONSES TO PHQ QUESTIONS 1-9: 3

## 2024-07-01 ENCOUNTER — VIRTUAL VISIT (OUTPATIENT)
Dept: PSYCHOLOGY | Facility: CLINIC | Age: 44
End: 2024-07-01
Payer: COMMERCIAL

## 2024-07-01 DIAGNOSIS — F10.90 ALCOHOL USE DISORDER: ICD-10-CM

## 2024-07-01 DIAGNOSIS — F41.1 GENERALIZED ANXIETY DISORDER: Primary | ICD-10-CM

## 2024-07-01 PROCEDURE — 90791 PSYCH DIAGNOSTIC EVALUATION: CPT | Mod: 95

## 2024-07-01 ASSESSMENT — COLUMBIA-SUICIDE SEVERITY RATING SCALE - C-SSRS
1. IN THE PAST MONTH, HAVE YOU WISHED YOU WERE DEAD OR WISHED YOU COULD GO TO SLEEP AND NOT WAKE UP?: NO
6. HAVE YOU EVER DONE ANYTHING, STARTED TO DO ANYTHING, OR PREPARED TO DO ANYTHING TO END YOUR LIFE?: NO
2. HAVE YOU ACTUALLY HAD ANY THOUGHTS OF KILLING YOURSELF IN THE PAST MONTH?: NO
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
## 2024-07-22 ENCOUNTER — VIRTUAL VISIT (OUTPATIENT)
Dept: PSYCHOLOGY | Facility: CLINIC | Age: 44
End: 2024-07-22
Payer: COMMERCIAL

## 2024-07-22 DIAGNOSIS — F41.1 GENERALIZED ANXIETY DISORDER: Primary | ICD-10-CM

## 2024-07-22 PROCEDURE — 90837 PSYTX W PT 60 MINUTES: CPT | Mod: 95

## 2024-07-22 NOTE — PROGRESS NOTES
[facility]                                     Progress Note    Patient Name: Ashutosh Velasco  Date: 7/22/24         Service Type: Individual      Session Start Time: 3:00 pm  Session End Time: 3:53 pm     Session Length: 53 min    Session #: 2    Attendees: Client attended alone    Service Modality:  Video Visit:      Provider verified identity through the following two step process.  Patient provided:  Patient is known previously to provider    Telemedicine Visit: The patient's condition can be safely assessed and treated via synchronous audio and visual telemedicine encounter.      Reason for Telemedicine Visit: Patient has requested telehealth visit    Originating Site (Patient Location): Patient's home    Distant Site (Provider Location): Provider Remote Setting- Home Office    Consent:  The patient/guardian has verbally consented to: the potential risks and benefits of telemedicine (video visit) versus in person care; bill my insurance or make self-payment for services provided; and responsibility for payment of non-covered services.     Patient would like the video invitation sent by:  My Chart    Mode of Communication:  Video Conference via [employer]well    Distant Location (Provider):  Off-site    As the provider I attest to compliance with applicable laws and regulations related to telemedicine.    DATA  Interactive Complexity: No  Crisis: No        Progress Since Last Session (Related to Symptoms / Goals / Homework):   Symptoms: Improving anxiety, not drinking alcohol    Homework: Did not complete      Episode of Care Goals: Minimal progress - PREPARATION (Decided to change - considering how); Intervened by negotiating a change plan and determining options / strategies for behavior change, identifying triggers, exploring social supports, and working towards setting a date to begin behavior change     Current / Ongoing Stressors and Concerns:   Adopted a rescue dog, working on 4th Step for AA,  work advancement frustration     Treatment Objective(s) Addressed in This Session:   identify 1-2 fears / thoughts that contribute to feeling anxious  Rapport building, understand AA progress     Intervention:   Interpersonal Therapy: Provided active listening and validation as patient reported on recent events. Processed step 4 work and need to find focus to go deep on resentment in various areas of life past and present. Surfaced workplace and home concerns causing anxiety    Assessments completed prior to visit:  The following assessments were completed by patient for this visit:  PHQ2:       7/21/2024     3:59 PM 4/18/2024     7:28 PM 1/18/2023    12:43 PM 5/31/2022     2:19 PM 5/31/2022     2:16 PM 10/20/2020     1:54 PM 11/1/2019     2:17 PM   PHQ-2 ( 1999 [employer])   Q1: Little interest or pleasure in doing things 0 0 0 0 0 0 0   Q2: Feeling down, depressed or hopeless 0 0 0 0 0 0 0   PHQ-2 Score 0 0 0 0 0 0 0   PHQ-2 Total Score (12-17 Years)- Positive if 3 or more points; Administer PHQ-A if positive      0 0   Q1: Little interest or pleasure in doing things Not at all Not at all Not at all Not at all Not at all Not at all Not at all   Q2: Feeling down, depressed or hopeless Not at all Not at all Not at all Not at all Not at all Not at all Not at all   PHQ-2 Score 0 0 0 0 0 0 0     PHQ9:       3/26/2018     8:27 AM 3/26/2018     8:28 AM 5/8/2018     3:56 PM 10/17/2018    11:47 AM 11/1/2019     2:14 PM 10/20/2020     1:51 PM 6/30/2024    10:23 AM   PHQ-9 SCORE   PHQ-9 Total Score MyChart 2 (Minimal depression)  1 (Minimal depression) 3 (Minimal depression) 2 (Minimal depression) 0 3 (Minimal depression)   PHQ-9 Total Score  2 1 3 2 0 3     GAD2:       6/30/2024    10:38 AM   KVNG-2   Feeling nervous, anxious, or on edge 1   Not being able to stop or control worrying 1   KVNG-2 Total Score 2     GAD7:       5/8/2018     3:56 PM 10/17/2018    11:47 AM 11/1/2019     2:15 PM 10/20/2020     1:52 PM 5/31/2022     2:19  PM 6/10/2023    10:08 AM 4/18/2024     7:28 PM   KVNG-7 SCORE   Total Score 2 (minimal anxiety) 5 (mild anxiety) 4 (minimal anxiety) 2 (minimal anxiety) 2 (minimal anxiety) 0 (minimal anxiety) 2 (minimal anxiety)   Total Score 2 5 4 2 2 0 2     CAGE-AID:       6/30/2024    10:40 AM   CAGE-AID Total Score   Total Score 4   Total Score MyChart 4 (A total score of 2 or greater is considered clinically significant)     PROMIS 10-Global Health (only subscores and total score):       6/30/2024    10:39 AM   PROMIS-10 Scores Only   Global Mental Health Score 15   Global Physical Health Score 17   PROMIS TOTAL - SUBSCORES 32     [place] Suicide Severity Rating Scale (Lifetime/Recent)      7/1/2024    11:00 AM   [place] Suicide Severity Rating (Lifetime/Recent)   Q1 Wished to be Dead (Past Month) 0-->no   Q2 Suicidal Thoughts (Past Month) 0-->no   Q6 Suicide Behavior (Lifetime) 0-->no   Level of Risk per Screen no risks indicated         ASSESSMENT: Current Emotional / Mental Status (status of significant symptoms):   Risk status (Self / Other harm or suicidal ideation)   Patient denies current fears or concerns for personal safety.   Patient denies current or recent suicidal ideation or behaviors.   Patient denies current or recent homicidal ideation or behaviors.   Patient denies current or recent self injurious behavior or ideation.   Patient denies other safety concerns.   Patient reports there has been no change in risk factors since their last session.     Patient reports there has been no change in protective factors since their last session.     Recommended that patient call 911 or go to the local ED should there be a change in any of these risk factors.     Appearance:   Appropriate    Eye Contact:   Good    Psychomotor Behavior: Normal    Attitude:   Cooperative  Friendly   Orientation:   All   Speech    Rate / Production: Normal/ Responsive Normal     Volume:  Normal    Mood:    Anxious    Affect:    Appropriate     Thought Content:  Clear    Thought Form:  Coherent  Goal Directed  Logical    Insight:    Good      Medication Review:   No changes to current psychiatric medication(s)     Medication Compliance:   Yes     Changes in Health Issues:   None reported     Chemical Use Review:   Substance Use: Currently not drinking, attend AA  3 days a week, has a sponsor     Tobacco Use: No current tobacco use.      Diagnosis:  1. Generalized anxiety disorder        Collateral Reports Completed:   Not Applicable    PLAN: (Patient Tasks / Therapist Tasks / Other)  Work on step 4, make a plan to return to office    KOTA Bronson  7/22/2024                                                           ______________________________________________________________________

## 2024-07-30 ENCOUNTER — TRANSFERRED RECORDS (OUTPATIENT)
Dept: HEALTH INFORMATION MANAGEMENT | Facility: CLINIC | Age: 44
End: 2024-07-30
Payer: COMMERCIAL

## 2024-08-13 ENCOUNTER — VIRTUAL VISIT (OUTPATIENT)
Dept: PSYCHOLOGY | Facility: CLINIC | Age: 44
End: 2024-08-13
Payer: COMMERCIAL

## 2024-08-13 DIAGNOSIS — F41.1 GENERALIZED ANXIETY DISORDER: Primary | ICD-10-CM

## 2024-08-13 PROCEDURE — 90837 PSYTX W PT 60 MINUTES: CPT | Mod: 95

## 2024-08-13 NOTE — PROGRESS NOTES
[facility]                                     Progress Note    Patient Name: Ashutosh Velasco  Date: 8/13/24         Service Type: Individual      Session Start Time: 3:00 pm  Session End Time: 3:53 pm     Session Length: 53 min    Session #: 3    Attendees: Client attended alone    Service Modality:  Video Visit:      Provider verified identity through the following two step process.  Patient provided:  Patient is known previously to provider    Telemedicine Visit: The patient's condition can be safely assessed and treated via synchronous audio and visual telemedicine encounter.      Reason for Telemedicine Visit: Patient has requested telehealth visit    Originating Site (Patient Location): Patient's home    Distant Site (Provider Location): Provider Remote Setting- Home Office    Consent:  The patient/guardian has verbally consented to: the potential risks and benefits of telemedicine (video visit) versus in person care; bill my insurance or make self-payment for services provided; and responsibility for payment of non-covered services.     Patient would like the video invitation sent by:  My Chart    Mode of Communication:  Video Conference via Amwell    Distant Location (Provider):  Off-site    As the provider I attest to compliance with applicable laws and regulations related to telemedicine.    DATA  Interactive Complexity: No  Crisis: No      Progress Since Last Session (Related to Symptoms / Goals / Homework):   Symptoms: Improving anxiety, not drinking alcohol    Homework: Achieved / completed to satisfaction      Episode of Care Goals: Satisfactory progress - PREPARATION (Decided to change - considering how); Intervened by negotiating a change plan and determining options / strategies for behavior change, identifying triggers, exploring social supports, and working towards setting a date to begin behavior change     Current / Ongoing Stressors and Concerns:   Adopted a rescue dog,  working on 4th Step for AA, work advancement frustration     Treatment Objective(s) Addressed in This Session:   identify 1-2 fears / thoughts that contribute to feeling anxious  Rapport building, understand AA progress     Intervention:   Interpersonal Therapy: Provided active listening and validation as patient reported on recent events. Processed step 4 resentments. Surfaced workplace and home concerns causing anxiety  EMDR: Introduction, calm safe state exercise    Assessments completed prior to visit:  The following assessments were completed by patient for this visit:  PHQ2:       7/21/2024     3:59 PM 4/18/2024     7:28 PM 1/18/2023    12:43 PM 5/31/2022     2:19 PM 5/31/2022     2:16 PM 10/20/2020     1:54 PM 11/1/2019     2:17 PM   PHQ-2 ( 1999 [employer])   Q1: Little interest or pleasure in doing things 0 0 0 0 0 0 0   Q2: Feeling down, depressed or hopeless 0 0 0 0 0 0 0   PHQ-2 Score 0 0 0 0 0 0 0   PHQ-2 Total Score (12-17 Years)- Positive if 3 or more points; Administer PHQ-A if positive      0 0   Q1: Little interest or pleasure in doing things Not at all Not at all Not at all Not at all Not at all Not at all Not at all   Q2: Feeling down, depressed or hopeless Not at all Not at all Not at all Not at all Not at all Not at all Not at all   PHQ-2 Score 0 0 0 0 0 0 0     PHQ9:       3/26/2018     8:27 AM 3/26/2018     8:28 AM 5/8/2018     3:56 PM 10/17/2018    11:47 AM 11/1/2019     2:14 PM 10/20/2020     1:51 PM 6/30/2024    10:23 AM   PHQ-9 SCORE   PHQ-9 Total Score MyChart 2 (Minimal depression)  1 (Minimal depression) 3 (Minimal depression) 2 (Minimal depression) 0 3 (Minimal depression)   PHQ-9 Total Score  2 1 3 2 0 3     GAD2:       6/30/2024    10:38 AM 8/13/2024     2:57 PM   KVNG-2   Feeling nervous, anxious, or on edge 1 0   Not being able to stop or control worrying 1 1   KVNG-2 Total Score 2 1     GAD7:       5/8/2018     3:56 PM 10/17/2018    11:47 AM 11/1/2019     2:15 PM 10/20/2020     1:52 PM  5/31/2022     2:19 PM 6/10/2023    10:08 AM 4/18/2024     7:28 PM   KVNG-7 SCORE   Total Score 2 (minimal anxiety) 5 (mild anxiety) 4 (minimal anxiety) 2 (minimal anxiety) 2 (minimal anxiety) 0 (minimal anxiety) 2 (minimal anxiety)   Total Score 2 5 4 2 2 0 2     CAGE-AID:       6/30/2024    10:40 AM   CAGE-AID Total Score   Total Score 4   Total Score MyChart 4 (A total score of 2 or greater is considered clinically significant)     PROMIS 10-Global Health (only subscores and total score):       6/30/2024    10:39 AM   PROMIS-10 Scores Only   Global Mental Health Score 15   Global Physical Health Score 17   PROMIS TOTAL - SUBSCORES 32     [place] Suicide Severity Rating Scale (Lifetime/Recent)      7/1/2024    11:00 AM   [place] Suicide Severity Rating (Lifetime/Recent)   Q1 Wished to be Dead (Past Month) 0-->no   Q2 Suicidal Thoughts (Past Month) 0-->no   Q6 Suicide Behavior (Lifetime) 0-->no   Level of Risk per Screen no risks indicated         ASSESSMENT: Current Emotional / Mental Status (status of significant symptoms):   Risk status (Self / Other harm or suicidal ideation)   Patient denies current fears or concerns for personal safety.   Patient denies current or recent suicidal ideation or behaviors.   Patient denies current or recent homicidal ideation or behaviors.   Patient denies current or recent self injurious behavior or ideation.   Patient denies other safety concerns.   Patient reports there has been no change in risk factors since their last session.     Patient reports there has been no change in protective factors since their last session.     Recommended that patient call 911 or go to the local ED should there be a change in any of these risk factors.     Appearance:   Appropriate    Eye Contact:   Good    Psychomotor Behavior: Normal    Attitude:   Cooperative  Friendly   Orientation:   All   Speech    Rate / Production: Normal/ Responsive Normal     Volume:  Normal    Mood:    Anxious     Affect:    Appropriate    Thought Content:  Clear    Thought Form:  Coherent  Goal Directed  Logical    Insight:    Good      Medication Review:   No changes to current psychiatric medication(s)     Medication Compliance:   Yes     Changes in Health Issues:   None reported     Chemical Use Review:   Substance Use: Currently not drinking, attend AA  3 days a week, has a sponsor     Tobacco Use: No current tobacco use.      Diagnosis:  1. Generalized anxiety disorder        Collateral Reports Completed:   Not Applicable    PLAN: (Patient Tasks / Therapist Tasks / Other)  Continue work on step 4, make a plan to return to office, use calm safe state    Sabra Carrion, [facility]  8/13/2024                                                           ______________________________________________________________________

## 2024-09-03 ENCOUNTER — VIRTUAL VISIT (OUTPATIENT)
Dept: PSYCHOLOGY | Facility: CLINIC | Age: 44
End: 2024-09-03
Payer: COMMERCIAL

## 2024-09-03 DIAGNOSIS — F41.1 GENERALIZED ANXIETY DISORDER: Primary | ICD-10-CM

## 2024-09-03 PROCEDURE — 90837 PSYTX W PT 60 MINUTES: CPT | Mod: 95

## 2024-09-03 NOTE — PROGRESS NOTES
[facility]                                     Progress Note    Patient Name: Ashutosh Velasco  Date: 9/3/24         Service Type: Individual      Session Start Time: 9:05 am  Session End Time: 9:58 am     Session Length: 53 min    Session #: 4    Attendees: Client attended alone    Service Modality:  Video Visit:      Provider verified identity through the following two step process.  Patient provided:  Patient is known previously to provider    Telemedicine Visit: The patient's condition can be safely assessed and treated via synchronous audio and visual telemedicine encounter.      Reason for Telemedicine Visit: Patient has requested telehealth visit    Originating Site (Patient Location): Patient's home    Distant Site (Provider Location): Provider Remote Setting- Home Office    Consent:  The patient/guardian has verbally consented to: the potential risks and benefits of telemedicine (video visit) versus in person care; bill my insurance or make self-payment for services provided; and responsibility for payment of non-covered services.     Patient would like the video invitation sent by:  My Chart    [facility] of Communication:  [facility]    Distant Location (Provider):  Off-site    As the provider I attest to compliance with applicable laws and regulations related to telemedicine.    DATA  Interactive Complexity: No  Crisis: No      Progress Since Last Session (Related to Symptoms / Goals / Homework):   Symptoms: Improving anxiety, not drinking alcohol    Homework: Achieved / completed to satisfaction      Episode of Care Goals: Satisfactory progress - ACTION (Actively working towards change); Intervened by reinforcing change plan / affirming steps taken     Current / Ongoing Stressors and Concerns:   Adopted a rescue dog, working on 4th Step for AA, work advancement frustration     Treatment Objective(s) Addressed in This Session:   use thought-stopping strategy daily to reduce  intrusive thoughts  Rapport building, understand AA progress     Intervention:   Interpersonal Therapy: Provided active listening and validation as patient reported on recent events. Processed step 4 resentments. Surfaced workplace and home concerns causing anxiety   Introduced Chela, role played in session.  EMDR: Reinforced calm safe state exercise, which patient has tried. Completed DESII scale, provided psychoeducation on EMDR, phases.     Assessments completed prior to visit:  The following assessments were completed by patient for this visit:  PHQ2:       7/21/2024     3:59 PM 4/18/2024     7:28 PM 1/18/2023    12:43 PM 5/31/2022     2:19 PM 5/31/2022     2:16 PM 10/20/2020     1:54 PM 11/1/2019     2:17 PM   PHQ-2 ( 1999 [employer])   Q1: Little interest or pleasure in doing things 0 0 0 0 0 0 0   Q2: Feeling down, depressed or hopeless 0 0 0 0 0 0 0   PHQ-2 Score 0 0 0 0 0 0 0   PHQ-2 Total Score (12-17 Years)- Positive if 3 or more points; Administer PHQ-A if positive      0 0   Q1: Little interest or pleasure in doing things Not at all Not at all Not at all Not at all Not at all Not at all Not at all   Q2: Feeling down, depressed or hopeless Not at all Not at all Not at all Not at all Not at all Not at all Not at all   PHQ-2 Score 0 0 0 0 0 0 0     PHQ9:       3/26/2018     8:27 AM 3/26/2018     8:28 AM 5/8/2018     3:56 PM 10/17/2018    11:47 AM 11/1/2019     2:14 PM 10/20/2020     1:51 PM 6/30/2024    10:23 AM   PHQ-9 SCORE   PHQ-9 Total Score MyChart 2 (Minimal depression)  1 (Minimal depression) 3 (Minimal depression) 2 (Minimal depression) 0 3 (Minimal depression)   PHQ-9 Total Score  2 1 3 2 0 3     GAD2:       6/30/2024    10:38 AM 8/13/2024     2:57 PM   KVNG-2   Feeling nervous, anxious, or on edge 1 0   Not being able to stop or control worrying 1 1   KVNG-2 Total Score 2 1     GAD7:       5/8/2018     3:56 PM 10/17/2018    11:47 AM 11/1/2019     2:15 PM 10/20/2020     1:52 PM 5/31/2022     2:19 PM  6/10/2023    10:08 AM 4/18/2024     7:28 PM   KVNG-7 SCORE   Total Score 2 (minimal anxiety) 5 (mild anxiety) 4 (minimal anxiety) 2 (minimal anxiety) 2 (minimal anxiety) 0 (minimal anxiety) 2 (minimal anxiety)   Total Score 2 5 4 2 2 0 2     CAGE-AID:       6/30/2024    10:40 AM   CAGE-AID Total Score   Total Score 4   Total Score MyChart 4 (A total score of 2 or greater is considered clinically significant)     PROMIS 10-Global Health (only subscores and total score):       6/30/2024    10:39 AM   PROMIS-10 Scores Only   Global Mental Health Score 15   Global Physical Health Score 17   PROMIS TOTAL - SUBSCORES 32     [place] Suicide Severity Rating Scale (Lifetime/Recent)      7/1/2024    11:00 AM   [place] Suicide Severity Rating (Lifetime/Recent)   Q1 Wished to be Dead (Past Month) 0-->no   Q2 Suicidal Thoughts (Past Month) 0-->no   Q6 Suicide Behavior (Lifetime) 0-->no   Level of Risk per Screen no risks indicated         ASSESSMENT: Current Emotional / Mental Status (status of significant symptoms):   Risk status (Self / Other harm or suicidal ideation)   Patient denies current fears or concerns for personal safety.   Patient denies current or recent suicidal ideation or behaviors.   Patient denies current or recent homicidal ideation or behaviors.   Patient denies current or recent self injurious behavior or ideation.   Patient denies other safety concerns.   Patient reports there has been no change in risk factors since their last session.     Patient reports there has been no change in protective factors since their last session.     Recommended that patient call 911 or go to the local ED should there be a change in any of these risk factors.     Appearance:   Appropriate    Eye Contact:   Good    Psychomotor Behavior: Normal    Attitude:   Cooperative  Friendly   Orientation:   All   Speech    Rate / Production: Normal/ Responsive Normal     Volume:  Normal    Mood:    Anxious    Affect:    Appropriate     Thought Content:  Clear    Thought Form:  Coherent  Goal Directed  Logical    Insight:    Good      Medication Review:   No changes to current psychiatric medication(s)     Medication Compliance:   Yes     Changes in Health Issues:   None reported     Chemical Use Review:   Substance Use: Currently not drinking, attend AA  3 days a week, has a sponsor     Tobacco Use: No current tobacco use.      Diagnosis:  1. Generalized anxiety disorder        Collateral Reports Completed:   Not Applicable    PLAN: (Patient Tasks / Therapist Tasks / Other)  Continue work on step 4, make a plan to return to office, use calm safe state, review EMDR materials, Chela Carrion, [facility]  9/3/2024                                                           ______________________________________________________________________

## 2024-09-16 ENCOUNTER — VIRTUAL VISIT (OUTPATIENT)
Dept: PSYCHOLOGY | Facility: CLINIC | Age: 44
End: 2024-09-16
Payer: COMMERCIAL

## 2024-09-16 DIAGNOSIS — F41.1 GENERALIZED ANXIETY DISORDER: Primary | ICD-10-CM

## 2024-09-16 PROCEDURE — 90837 PSYTX W PT 60 MINUTES: CPT | Mod: 95

## 2024-09-16 NOTE — PROGRESS NOTES
[facility]                                     Progress Note    Patient Name: Ashutosh Velasco  Date: 9/16/24         Service Type: Individual      Session Start Time: 9:00 am  Session End Time: 9:53 am     Session Length: 53 min    Session #: 5    Attendees: Client attended alone    Service Modality:  Video Visit:      Provider verified identity through the following two step process.  Patient provided:  Patient is known previously to provider    Telemedicine Visit: The patient's condition can be safely assessed and treated via synchronous audio and visual telemedicine encounter.      Reason for Telemedicine Visit: Patient has requested telehealth visit    Originating Site (Patient Location): Patient's home    Distant Site (Provider Location): Provider Remote Setting- Home Office    Consent:  The patient/guardian has verbally consented to: the potential risks and benefits of telemedicine (video visit) versus in person care; bill my insurance or make self-payment for services provided; and responsibility for payment of non-covered services.     Patient would like the video invitation sent by:  My Chart    Mode of [facility]:  [facility]    Distant Location (Provider):  Off-site    As the provider I attest to compliance with applicable laws and regulations related to telemedicine.    DATA  Interactive Complexity: No  Crisis: No      Progress Since Last Session (Related to Symptoms / Goals / Homework):   Symptoms: Improving anxiety, not drinking alcohol    Homework: Achieved / completed to satisfaction      Episode of Care Goals: Satisfactory progress - ACTION (Actively working towards change); Intervened by reinforcing change plan / affirming steps taken     Current / Ongoing Stressors and Concerns:   Working to prioritize leisure plans (time outdoors), working on next steps for AA, work advancement frustration     Treatment Objective(s) Addressed in This Session:   use  thought-stopping strategy daily to reduce intrusive thoughts  Calm safe state  Proactive communication to get needs met     Intervention:   Interpersonal Therapy: Provided active listening and validation as patient reported on recent events. Surfaced workplace and home concerns causing anxiety   Reviewed Chela, role played in session.  EMDR: Reinforced calm safe state exercise,     Assessments completed prior to visit:  The following assessments were completed by patient for this visit:  PHQ2:       7/21/2024     3:59 PM 4/18/2024     7:28 PM 1/18/2023    12:43 PM 5/31/2022     2:19 PM 5/31/2022     2:16 PM 10/20/2020     1:54 PM 11/1/2019     2:17 PM   PHQ-2 ( 1999 [employer])   Q1: Little interest or pleasure in doing things 0 0 0 0 0 0 0   Q2: Feeling down, depressed or hopeless 0 0 0 0 0 0 0   PHQ-2 Score 0 0 0 0 0 0 0   PHQ-2 Total Score (12-17 Years)- Positive if 3 or more points; Administer PHQ-A if positive      0 0   Q1: Little interest or pleasure in doing things Not at all Not at all Not at all Not at all Not at all Not at all Not at all   Q2: Feeling down, depressed or hopeless Not at all Not at all Not at all Not at all Not at all Not at all Not at all   PHQ-2 Score 0 0 0 0 0 0 0     PHQ9:       3/26/2018     8:27 AM 3/26/2018     8:28 AM 5/8/2018     3:56 PM 10/17/2018    11:47 AM 11/1/2019     2:14 PM 10/20/2020     1:51 PM 6/30/2024    10:23 AM   PHQ-9 SCORE   PHQ-9 Total Score MyChart 2 (Minimal depression)  1 (Minimal depression) 3 (Minimal depression) 2 (Minimal depression) 0 3 (Minimal depression)   PHQ-9 Total Score  2 1 3 2 0 3     GAD2:       6/30/2024    10:38 AM 8/13/2024     2:57 PM 9/16/2024     8:55 AM   KVNG-2   Feeling nervous, anxious, or on edge 1 0 0   Not being able to stop or control worrying 1 1 0   KVNG-2 Total Score 2 1 0     GAD7:       5/8/2018     3:56 PM 10/17/2018    11:47 AM 11/1/2019     2:15 PM 10/20/2020     1:52 PM 5/31/2022     2:19 PM 6/10/2023    10:08 AM 4/18/2024      7:28 PM   KVNG-7 SCORE   Total Score 2 (minimal anxiety) 5 (mild anxiety) 4 (minimal anxiety) 2 (minimal anxiety) 2 (minimal anxiety) 0 (minimal anxiety) 2 (minimal anxiety)   Total Score 2 5 4 2 2 0 2     CAGE-AID:       6/30/2024    10:40 AM   CAGE-AID Total Score   Total Score 4   Total Score MyChart 4 (A total score of 2 or greater is considered clinically significant)     PROMIS 10-Global Health (only subscores and total score):       6/30/2024    10:39 AM   PROMIS-10 Scores Only   Global Mental Health Score 15   Global Physical Health Score 17   PROMIS TOTAL - SUBSCORES 32     [place] Suicide Severity Rating Scale (Lifetime/Recent)      7/1/2024    11:00 AM   [place] Suicide Severity Rating (Lifetime/Recent)   Q1 Wished to be Dead (Past Month) 0-->no   Q2 Suicidal Thoughts (Past Month) 0-->no   Q6 Suicide Behavior (Lifetime) 0-->no   Level of Risk per Screen no risks indicated         ASSESSMENT: Current Emotional / Mental Status (status of significant symptoms):   Risk status (Self / Other harm or suicidal ideation)   Patient denies current fears or concerns for personal safety.   Patient denies current or recent suicidal ideation or behaviors.   Patient denies current or recent homicidal ideation or behaviors.   Patient denies current or recent self injurious behavior or ideation.   Patient denies other safety concerns.   Patient reports there has been no change in risk factors since their last session.     Patient reports there has been no change in protective factors since their last session.     Recommended that patient call 911 or go to the local ED should there be a change in any of these risk factors.     Appearance:   Appropriate    Eye Contact:   Good    Psychomotor Behavior: Normal    Attitude:   Cooperative  Friendly   Orientation:   All   Speech    Rate / Production: Normal/ Responsive Normal     Volume:  Normal    Mood:    Anxious    Affect:    Appropriate    Thought Content:  Clear    Thought  Form:  Coherent  Goal Directed  Logical    Insight:    Good      Medication Review:   No changes to current psychiatric medication(s)     Medication Compliance:   Yes     Changes in Health Issues:   None reported     Chemical Use Review:   Substance Use: Currently not drinking, attend AA  3 days a week, has a sponsor     Tobacco Use: No current tobacco use.      Diagnosis:  1. Generalized anxiety disorder        Collateral Reports Completed:   Not Applicable    PLAN: (Patient Tasks / Therapist Tasks / Other)  Continue work on AA steps, continue return to office, use [facility]oso[facility]  9/16/2024                                                           ______________________________________________________________________

## 2024-10-15 ENCOUNTER — VIRTUAL VISIT (OUTPATIENT)
Dept: PSYCHOLOGY | Facility: CLINIC | Age: 44
End: 2024-10-15
Payer: COMMERCIAL

## 2024-10-15 DIAGNOSIS — F41.1 GENERALIZED ANXIETY DISORDER: Primary | ICD-10-CM

## 2024-10-15 PROCEDURE — 90837 PSYTX W PT 60 MINUTES: CPT | Mod: 95

## 2024-10-15 NOTE — PROGRESS NOTES
[facility]                                     Progress Note    Patient Name: Ashutosh Velasco  Date: 10/15/24         Service Type: Individual      Session Start Time: 9:05 am  Session End Time: 9:58 am     Session Length: 53 min    Session #: 6    Attendees: Client attended alone    Service Modality:  Video Visit:      Provider verified identity through the following two step process.  Patient provided:  Patient is known previously to provider    Telemedicine Visit: The patient's condition can be safely assessed and treated via synchronous audio and visual telemedicine encounter.      Reason for Telemedicine Visit: Patient has requested telehealth visit    Originating Site (Patient Location): Patient's home    Distant Site (Provider Location): Provider Remote Setting- Home Office    Consent:  The patient/guardian has verbally consented to: the potential risks and benefits of telemedicine (video visit) versus in person care; bill my insurance or make self-payment for services provided; and responsibility for payment of non-covered services.     Patient would like the video invitation sent by:  My Chart    Mode of Communication:  [facility]    Distant Location (Provider):  Off-site    As the provider I attest to compliance with applicable laws and regulations related to telemedicine.    DATA  Interactive Complexity: No  Crisis: No      Progress Since Last Session (Related to Symptoms / Goals / Homework):   Symptoms: Improving anxiety, not drinking alcohol    Homework: Achieved / completed to satisfaction      Episode of Care Goals: Satisfactory progress - ACTION (Actively working towards change); Intervened by reinforcing change plan / affirming steps taken     Current / Ongoing Stressors and Concerns:   Working to prioritize leisure plans (time outdoors), working on next steps for AA     Treatment Objective(s) Addressed in This Session:   use thought-stopping strategy daily to  reduce intrusive thoughts  Calm safe state  Proactive communication to get needs met     Intervention:   Interpersonal Therapy: Provided active listening and validation as patient reported on recent events. Processed recent insights, adaptive behavior engagement with sobriety including time alone while wife travelled. Celebrated success. Explored reconnection with old friends as part of recovery  EMDR: Reinforced calm safe state exercise.    Assessments completed prior to visit:  The following assessments were completed by patient for this visit:  PHQ2:       7/21/2024     3:59 PM 4/18/2024     7:28 PM 1/18/2023    12:43 PM 5/31/2022     2:19 PM 5/31/2022     2:16 PM 10/20/2020     1:54 PM 11/1/2019     2:17 PM   PHQ-2 ( 1999 [employer])   Q1: Little interest or pleasure in doing things 0 0 0 0 0 0 0   Q2: Feeling down, depressed or hopeless 0 0 0 0 0 0 0   PHQ-2 Score 0 0 0 0 0 0 0   PHQ-2 Total Score (12-17 Years)- Positive if 3 or more points; Administer PHQ-A if positive      0 0   Q1: Little interest or pleasure in doing things Not at all Not at all Not at all Not at all Not at all Not at all Not at all   Q2: Feeling down, depressed or hopeless Not at all Not at all Not at all Not at all Not at all Not at all Not at all   PHQ-2 Score 0 0 0 0 0 0 0     PHQ9:       3/26/2018     8:27 AM 3/26/2018     8:28 AM 5/8/2018     3:56 PM 10/17/2018    11:47 AM 11/1/2019     2:14 PM 10/20/2020     1:51 PM 6/30/2024    10:23 AM   PHQ-9 SCORE   PHQ-9 Total Score MyChart 2 (Minimal depression)  1 (Minimal depression) 3 (Minimal depression) 2 (Minimal depression) 0 3 (Minimal depression)   PHQ-9 Total Score  2 1 3 2 0 3     GAD2:       6/30/2024    10:38 AM 8/13/2024     2:57 PM 9/16/2024     8:55 AM   KVNG-2   Feeling nervous, anxious, or on edge 1 0 0   Not being able to stop or control worrying 1 1 0   KVNG-2 Total Score 2 1 0     GAD7:       5/8/2018     3:56 PM 10/17/2018    11:47 AM 11/1/2019     2:15 PM 10/20/2020     1:52 PM  5/31/2022     2:19 PM 6/10/2023    10:08 AM 4/18/2024     7:28 PM   KVNG-7 SCORE   Total Score 2 (minimal anxiety) 5 (mild anxiety) 4 (minimal anxiety) 2 (minimal anxiety) 2 (minimal anxiety) 0 (minimal anxiety) 2 (minimal anxiety)   Total Score 2 5 4 2 2 0 2     CAGE-AID:       6/30/2024    10:40 AM   CAGE-AID Total Score   Total Score 4   Total Score MyChart 4 (A total score of 2 or greater is considered clinically significant)     PROMIS 10-Global Health (only subscores and total score):       6/30/2024    10:39 AM 10/13/2024    10:27 AM   PROMIS-10 Scores Only   Global Mental Health Score 15 16   Global Physical Health Score 17 17   PROMIS TOTAL - SUBSCORES 32 33     [place] Suicide Severity Rating Scale (Lifetime/Recent)      7/1/2024    11:00 AM   [place] Suicide Severity Rating (Lifetime/Recent)   Q1 Wished to be Dead (Past Month) 0-->no   Q2 Suicidal Thoughts (Past Month) 0-->no   Q6 Suicide Behavior (Lifetime) 0-->no   Level of Risk per Screen no risks indicated         ASSESSMENT: Current Emotional / Mental Status (status of significant symptoms):   Risk status (Self / Other harm or suicidal ideation)   Patient denies current fears or concerns for personal safety.   Patient denies current or recent suicidal ideation or behaviors.   Patient denies current or recent homicidal ideation or behaviors.   Patient denies current or recent self injurious behavior or ideation.   Patient denies other safety concerns.   Patient reports there has been no change in risk factors since their last session.     Patient reports there has been no change in protective factors since their last session.     Recommended that patient call 911 or go to the local ED should there be a change in any of these risk factors.     Appearance:   Appropriate    Eye Contact:   Good    Psychomotor Behavior: Normal    Attitude:   Cooperative  Friendly   Orientation:   All   Speech    Rate / Production: Normal/ Responsive Normal      Volume:  Normal    Mood:    Anxious    Affect:    Appropriate    Thought Content:  Clear    Thought Form:  Coherent  Goal Directed  Logical    Insight:    Good      Medication Review:   No changes to current psychiatric medication(s)     Medication Compliance:   Yes     Changes in Health Issues:   None reported     Chemical Use Review:   Substance Use: Currently not drinking, attend AA  3 days a week, has a sponsor     Tobacco Use: No current tobacco use.      Diagnosis:  1. Generalized anxiety disorder        Collateral Reports Completed:   Not Applicable    PLAN: (Patient Tasks / Therapist Tasks / Other)  Continue work on AA steps, attend meetings, continue return to office, use Chela meraz, [facility]  10/15/2024                                                           ______________________________________________________________________

## 2024-10-29 ENCOUNTER — VIRTUAL VISIT (OUTPATIENT)
Dept: PSYCHOLOGY | Facility: CLINIC | Age: 44
End: 2024-10-29
Payer: COMMERCIAL

## 2024-10-29 DIAGNOSIS — F10.90 ALCOHOL USE DISORDER: ICD-10-CM

## 2024-10-29 DIAGNOSIS — F41.1 GENERALIZED ANXIETY DISORDER: Primary | ICD-10-CM

## 2024-10-29 PROCEDURE — 90837 PSYTX W PT 60 MINUTES: CPT | Mod: 95

## 2024-10-29 NOTE — PROGRESS NOTES
[facility]                                     Progress Note    Patient Name: Ashutosh Velasco  Date: 10/29/24         Service Type: Individual      Session Start Time: 9:05 am  Session End Time: 9:58 am     Session Length: 53 min    Session #: 7    Attendees: Client attended alone    Service Modality:  Video Visit:      Provider verified identity through the following two step process.  Patient provided:  Patient is known previously to provider    Telemedicine Visit: The patient's condition can be safely assessed and treated via synchronous audio and visual telemedicine encounter.      Reason for Telemedicine Visit: Patient has requested telehealth visit    Originating Site (Patient Location): Patient's home    Distant Site (Provider Location): Provider Remote Setting- Home Office    Consent:  The patient/guardian has verbally consented to: the potential risks and benefits of telemedicine (video visit) versus in person care; bill my insurance or make self-payment for services provided; and responsibility for payment of non-covered services.     Patient would like the video invitation sent by:  My Chart    Mode of Communication:  [facility]    Distant Location (Provider):  Off-site    As the provider I attest to compliance with applicable laws and regulations related to telemedicine.    DATA  Interactive Complexity: No  Crisis: No      Progress Since Last Session (Related to Symptoms / Goals / Homework):   Symptoms: Improving anxiety, not drinking alcohol    Homework: Achieved / completed to satisfaction      Episode of Care Goals: Satisfactory progress - ACTION (Actively working towards change); Intervened by reinforcing change plan / affirming steps taken     Current / Ongoing Stressors and Concerns:   Working to prioritize leisure plans (time outdoors), working on next steps for AA, focus on relationship primary and others     Treatment Objective(s) Addressed in This  Session:   use thought-stopping strategy daily to reduce intrusive thoughts  Calm safe state  Proactive communication to get needs met     Intervention:   Interpersonal Therapy: Provided active listening and validation as patient reported on recent events. Processed recent insights, adaptive behavior engagement with sobriety including time alone while wife travelled. Celebrated success. Explored reconnection with old friends as part of recovery  EMDR: Reinforced calm safe state exercise.    Assessments completed prior to visit:  The following assessments were completed by patient for this visit:  PHQ2:       10/28/2024     9:55 AM 7/21/2024     3:59 PM 4/18/2024     7:28 PM 1/18/2023    12:43 PM 5/31/2022     2:19 PM 5/31/2022     2:16 PM 10/20/2020     1:54 PM   PHQ-2 ( 1999 [employer])   Q1: Little interest or pleasure in doing things 0  0  0  0  0  0  0    Q2: Feeling down, depressed or hopeless 0  0  0  0  0  0  0    PHQ-2 Score 0  0 0 0 0 0 0   PHQ-2 Total Score (12-17 Years)- Positive if 3 or more points; Administer PHQ-A if positive       0   Q1: Little interest or pleasure in doing things Not at all Not at all Not at all Not at all Not at all Not at all Not at all   Q2: Feeling down, depressed or hopeless Not at all Not at all Not at all Not at all Not at all Not at all Not at all   PHQ-2 Score 0 0 0 0 0 0 0       Patient-reported     PHQ9:       3/26/2018     8:27 AM 3/26/2018     8:28 AM 5/8/2018     3:56 PM 10/17/2018    11:47 AM 11/1/2019     2:14 PM 10/20/2020     1:51 PM 6/30/2024    10:23 AM   PHQ-9 SCORE   PHQ-9 Total Score MyChart 2 (Minimal depression)  1 (Minimal depression) 3 (Minimal depression) 2 (Minimal depression) 0 3 (Minimal depression)   PHQ-9 Total Score  2 1 3 2 0 3     GAD2:       6/30/2024    10:38 AM 8/13/2024     2:57 PM 9/16/2024     8:55 AM 10/28/2024     9:55 AM   KVNG-2   Feeling nervous, anxious, or on edge 1  0  0  0    Not being able to stop or control worrying 1  1  0  0    KVNG-2  Total Score 2 1 0 0        Patient-reported     GAD7:       5/8/2018     3:56 PM 10/17/2018    11:47 AM 11/1/2019     2:15 PM 10/20/2020     1:52 PM 5/31/2022     2:19 PM 6/10/2023    10:08 AM 4/18/2024     7:28 PM   KVNG-7 SCORE   Total Score 2 (minimal anxiety) 5 (mild anxiety) 4 (minimal anxiety) 2 (minimal anxiety) 2 (minimal anxiety) 0 (minimal anxiety) 2 (minimal anxiety)   Total Score 2 5 4 2 2 0 2     CAGE-AID:       6/30/2024    10:40 AM   CAGE-AID Total Score   Total Score 4   Total Score MyChart 4 (A total score of 2 or greater is considered clinically significant)     PROMIS 10-Global Health (only subscores and total score):       6/30/2024    10:39 AM 10/13/2024    10:27 AM   PROMIS-10 Scores Only   Global Mental Health Score 15 16   Global Physical Health Score 17 17   PROMIS TOTAL - SUBSCORES 32 33     [place] Suicide Severity Rating Scale (Lifetime/Recent)      7/1/2024    11:00 AM   [place] Suicide Severity Rating (Lifetime/Recent)   Q1 Wished to be Dead (Past Month) 0-->no   Q2 Suicidal Thoughts (Past Month) 0-->no   Q6 Suicide Behavior (Lifetime) 0-->no   Level of Risk per Screen no risks indicated         ASSESSMENT: Current Emotional / Mental Status (status of significant symptoms):   Risk status (Self / Other harm or suicidal ideation)   Patient denies current fears or concerns for personal safety.   Patient denies current or recent suicidal ideation or behaviors.   Patient denies current or recent homicidal ideation or behaviors.   Patient denies current or recent self injurious behavior or ideation.   Patient denies other safety concerns.   Patient reports there has been no change in risk factors since their last session.     Patient reports there has been no change in protective factors since their last session.     Recommended that patient call 911 or go to the local ED should there be a change in any of these risk factors.     Appearance:   Appropriate    Eye Contact:   Good     Psychomotor Behavior: Normal    Attitude:   Cooperative  Friendly   Orientation:   All   Speech    Rate / Production: Normal/ Responsive Normal     Volume:  Normal    Mood:    Anxious    Affect:    Appropriate    Thought Content:  Clear    Thought Form:  Coherent  Goal Directed  Logical    Insight:    Good      Medication Review:   No changes to current psychiatric medication(s)     Medication Compliance:   Yes     Changes in Health Issues:   None reported     Chemical Use Review:   Substance Use: Currently not drinking, attend AA  3 days a week, has a sponsor     Tobacco Use: No current tobacco use.      Diagnosis:  1. Generalized anxiety disorder    2. Alcohol use disorder        Collateral Reports Completed:   Not Applicable    PLAN: (Patient Tasks / Therapist Tasks / Other)  Continue work on AA steps, attend meetings, continue return to office, use calm safe state, Chela Carrion, [facility]  10/29/2024                                                Individual Treatment Plan    Patient's Name: Ashutosh Velasco  YOB: 1980    Date of Creation: 10/1/24  Date Treatment Plan Last Reviewed/Revised:     DSM5 Diagnoses: 300.02 (F41.1) Generalized Anxiety Disorder Alcohol use disorder  Psychosocial / Contextual Factors: trauma hx  PROMIS (reviewed every 90 days):     Referral / Collaboration:  Referral to another professional/service is not indicated at this time..    Anticipated number of session for this episode of care:  tbd  Anticipation frequency of session:  every 2-4 weeks as indicated  Anticipated Duration of each session: 53 or more minutes  Treatment plan will be reviewed in 90 days or when goals have been changed.       MeasurableTreatment Goal(s) related to diagnosis / functional impairment(s)  Goal 1: Patient will maintain sobriety    I will know I've met my goal when I am consistent and sober.      Objective #A (Patient Action)    Patient will maintain sobriety and attend AA weekly or  more .  Work on steps  Get more deeply involved In AA  Status: New - Date: Oct1, 2024      Intervention(s)  Therapist will teach  support patient's focus on AA .  Goal 2: Patient will address anxiety, using skills    I will know I've met my goal when anxiety is reduced and functioning improves.      Objective #A (Patient Action)    Status: New - Date: 10/1/24    Use thought-stopping strategy daily to reduce intrusive thoughts  Calm safe state  Proactive communication to get needs met  Prioritize healthy activities involving nature/outdoors.  Prioritize relationships  Practice mindfulness/grounding    Intervention(s)  Therapist will  teach skills and assign homework .      Patient has reviewed and agreed to the above plan.      Sabra Carrion, [facility]  October 1, 2024                                          ______________________________________________________________________

## 2024-11-21 ENCOUNTER — VIRTUAL VISIT (OUTPATIENT)
Dept: PSYCHOLOGY | Facility: CLINIC | Age: 44
End: 2024-11-21
Payer: COMMERCIAL

## 2024-11-21 DIAGNOSIS — F41.1 GENERALIZED ANXIETY DISORDER: Primary | ICD-10-CM

## 2024-11-21 DIAGNOSIS — F10.90 ALCOHOL USE DISORDER: ICD-10-CM

## 2024-11-21 NOTE — PROGRESS NOTES
[facility]                                     Progress Note    Patient Name: Ashutosh Velasco  Date: 11/21/24         Service Type: Individual      Session Start Time: 2:05 pm  Session End Time: 2:58 pm     Session Length: 53 min    Session #: 8    Attendees: Client attended alone    Service Modality:  Video Visit:      Provider verified identity through the following two step process.  Patient provided:  Patient is known previously to provider    Telemedicine Visit: The patient's condition can be safely assessed and treated via synchronous audio and visual telemedicine encounter.      Reason for Telemedicine Visit: Patient has requested telehealth visit    Originating Site (Patient Location): Patient's home    Distant Site (Provider Location): Provider Remote Setting- Home Office    Consent:  The patient/guardian has verbally consented to: the potential risks and benefits of telemedicine (video visit) versus in person care; bill my insurance or make self-payment for services provided; and responsibility for payment of non-covered services.     Patient would like the video invitation sent by:  My Chart    Mode of Communication:  Video Conference via Amwell    Distant Location (Provider):  Off-site    As the provider I attest to compliance with applicable laws and regulations related to telemedicine.    DATA  Interactive Complexity: No  Crisis: No      Progress Since Last Session (Related to Symptoms / Goals / Homework):   Symptoms: Improving anxiety, not drinking alcohol    Homework: Achieved / completed to satisfaction      Episode of Care Goals: Satisfactory progress - ACTION (Actively working towards change); Intervened by reinforcing change plan / affirming steps taken     Current / Ongoing Stressors and Concerns:   Working to prioritize leisure plans (time outdoors), working on next steps for AA,leadership opps,  focus on relationship primary and others, treating self with  kindness     Treatment Objective(s) Addressed in This Session:   use thought-stopping strategy daily to reduce intrusive thoughts  Proactive communication to get needs met  [place] kindness meditation  Patient will maintain sobriety and attend AA weekly or more.  Work on steps  Get more deeply involved In AA     Intervention:   Interpersonal Therapy: Provided active listening and validation as patient reported on recent events. Processed recent insights, adaptive behavior engagement with sobriety including time alone while wife travelled. Celebrated success and new AA leadership opportunities. Completed loving kindness meditation  EMDR: Reinforced calm safe state exercise.    Assessments completed prior to visit:  The following assessments were completed by patient for this visit:  PHQ2:       10/28/2024     9:55 AM 7/21/2024     3:59 PM 4/18/2024     7:28 PM 1/18/2023    12:43 PM 5/31/2022     2:19 PM 5/31/2022     2:16 PM 10/20/2020     1:54 PM   PHQ-2 ( 1999 [employer])   Q1: Little interest or pleasure in doing things 0  0  0  0  0  0  0    Q2: Feeling down, depressed or hopeless 0  0  0  0  0  0  0    PHQ-2 Score 0  0 0 0 0 0 0   PHQ-2 Total Score (12-17 Years)- Positive if 3 or more points; Administer PHQ-A if positive       0   Q1: Little interest or pleasure in doing things Not at all Not at all Not at all Not at all Not at all Not at all Not at all   Q2: Feeling down, depressed or hopeless Not at all Not at all Not at all Not at all Not at all Not at all Not at all   PHQ-2 Score 0 0 0 0 0 0 0       Patient-reported     PHQ9:       3/26/2018     8:27 AM 3/26/2018     8:28 AM 5/8/2018     3:56 PM 10/17/2018    11:47 AM 11/1/2019     2:14 PM 10/20/2020     1:51 PM 6/30/2024    10:23 AM   PHQ-9 SCORE   PHQ-9 Total Score MyChart 2 (Minimal depression)  1 (Minimal depression) 3 (Minimal depression) 2 (Minimal depression) 0 3 (Minimal depression)   PHQ-9 Total Score  2 1 3 2 0 3     GAD2:       6/30/2024    10:38 AM  8/13/2024     2:57 PM 9/16/2024     8:55 AM 10/28/2024     9:55 AM   KVNG-2   Feeling nervous, anxious, or on edge 1  0  0  0    Not being able to stop or control worrying 1  1  0  0    KVNG-2 Total Score 2 1 0 0        Patient-reported     GAD7:       5/8/2018     3:56 PM 10/17/2018    11:47 AM 11/1/2019     2:15 PM 10/20/2020     1:52 PM 5/31/2022     2:19 PM 6/10/2023    10:08 AM 4/18/2024     7:28 PM   KVNG-7 SCORE   Total Score 2 (minimal anxiety) 5 (mild anxiety) 4 (minimal anxiety) 2 (minimal anxiety) 2 (minimal anxiety) 0 (minimal anxiety) 2 (minimal anxiety)   Total Score 2 5 4 2 2 0 2     CAGE-AID:       6/30/2024    10:40 AM   CAGE-AID Total Score   Total Score 4   Total Score MyChart 4 (A total score of 2 or greater is considered clinically significant)     PROMIS 10-Global Health (only subscores and total score):       6/30/2024    10:39 AM 10/13/2024    10:27 AM   PROMIS-10 Scores Only   Global Mental Health Score 15 16   Global Physical Health Score 17 17   PROMIS TOTAL - SUBSCORES 32 33     [place] Suicide Severity Rating Scale (Lifetime/Recent)      7/1/2024    11:00 AM   [place] Suicide Severity Rating (Lifetime/Recent)   Q1 Wished to be Dead (Past Month) 0-->no   Q2 Suicidal Thoughts (Past Month) 0-->no   Q6 Suicide Behavior (Lifetime) 0-->no   Level of Risk per Screen no risks indicated         ASSESSMENT: Current Emotional / Mental Status (status of significant symptoms):   Risk status (Self / Other harm or suicidal ideation)   Patient denies current fears or concerns for personal safety.   Patient denies current or recent suicidal ideation or behaviors.   Patient denies current or recent homicidal ideation or behaviors.   Patient denies current or recent self injurious behavior or ideation.   Patient denies other safety concerns.   Patient reports there has been no change in risk factors since their last session.     Patient reports there has been no change in protective factors since their last  session.     Recommended that patient call 911 or go to the local ED should there be a change in any of these risk factors.     Appearance:   Appropriate    Eye Contact:   Good    Psychomotor Behavior: Normal    Attitude:   Cooperative  Friendly   Orientation:   All   Speech    Rate / Production: Normal/ Responsive Normal     Volume:  Normal    Mood:    Anxious    Affect:    Appropriate    Thought Content:  Clear    Thought Form:  Coherent  Goal Directed  Logical    Insight:    Good      Medication Review:   No changes to current psychiatric medication(s)     Medication Compliance:   Yes     Changes in Health Issues:   None reported     Chemical Use Review:   Substance Use: Currently not drinking, attend AA  3 days a week, has a sponsor     Tobacco Use: No current tobacco use.      Diagnosis:  1. Generalized anxiety disorder    2. Alcohol use disorder        Collateral Reports Completed:   Not Applicable    PLAN: (Patient Tasks / Therapist Tasks / Other)  Continue work on AA steps, attend meetings, continue return to office, use calm safe state, Chela, try loving kindness mindfulness meditation.     Sabra Carrion, [facility]  11/21/2024                                                Individual Treatment Plan    Patient's Name: Ashutosh Velasco  YOB: 1980    Date of Creation: 10/1/24  Date Treatment Plan Last Reviewed/Revised:     DSM5 Diagnoses: 300.02 (F41.1) Generalized Anxiety Disorder Alcohol use disorder  Psychosocial / Contextual Factors: trauma hx  PROMIS (reviewed every 90 days):     Referral / Collaboration:  Referral to another professional/service is not indicated at this time..    Anticipated number of session for this episode of care:  tbd  Anticipation frequency of session:  every 2-4 weeks as indicated  Anticipated Duration of each session: 53 or more minutes  Treatment plan will be reviewed in 90 days or when goals have been changed.       MeasurableTreatment Goal(s) related to diagnosis /  functional impairment(s)  Goal 1: Patient will maintain sobriety    I will know I've met my goal when I am consistent and sober.      Objective #A (Patient Action)    Patient will maintain sobriety and attend AA weekly or more .  Work on steps  Get more deeply involved In AA  Status: New - Date: Oct1, 2024      Intervention(s)  Therapist will teach  support patient's focus on AA .  Goal 2: Patient will address anxiety, using skills    I will know I've met my goal when anxiety is reduced and functioning improves.      Objective #A (Patient Action)    Status: New - Date: 10/1/24    Use thought-stopping strategy daily to reduce intrusive thoughts  Calm safe state  Proactive communication to get needs met  Prioritize healthy activities involving nature/outdoors.  Prioritize relationships  Practice mindfulness/grounding  [place] kindness meditation  Intervention(s)  Therapist will  teach skills and assign homework .      Patient has reviewed and agreed to the above plan.      Sabra Carrion, [facility]  October 1, 2024                                          ______________________________________________________________________

## 2024-12-06 ENCOUNTER — MYC MEDICAL ADVICE (OUTPATIENT)
Dept: FAMILY MEDICINE | Facility: CLINIC | Age: 44
End: 2024-12-06
Payer: COMMERCIAL

## 2024-12-19 ENCOUNTER — VIRTUAL VISIT (OUTPATIENT)
Dept: PSYCHOLOGY | Facility: CLINIC | Age: 44
End: 2024-12-19
Payer: COMMERCIAL

## 2024-12-19 DIAGNOSIS — F41.1 GENERALIZED ANXIETY DISORDER: Primary | ICD-10-CM

## 2024-12-19 NOTE — PROGRESS NOTES
[facility]                                     Progress Note    Patient Name: Ashutosh Velasco  Date: 12/19/24         Service Type: Individual      Session Start Time: 4:03 pm  Session End Time: 4:56 pm     Session Length: 53 min    Session #: 9    Attendees: Client attended alone    Service Modality:  Video Visit:      Provider verified identity through the following two step process.  Patient provided:  Patient is known previously to provider    Telemedicine Visit: The patient's condition can be safely assessed and treated via synchronous audio and visual telemedicine encounter.      Reason for Telemedicine Visit: Patient has requested telehealth visit    Originating Site (Patient Location): Patient's home    Distant Site (Provider Location): Provider Remote Setting- Home Office    Consent:  The patient/guardian has verbally consented to: the potential risks and benefits of telemedicine (video visit) versus in person care; bill my insurance or make self-payment for services provided; and responsibility for payment of non-covered services.     Patient would like the video invitation sent by:  My Chart    Mode of Communication:  Video Conference via Amwell    Distant Location (Provider):  Off-site    As the provider I attest to compliance with applicable laws and regulations related to telemedicine.    DATA  Interactive Complexity: No  Crisis: No      Progress Since Last Session (Related to Symptoms / Goals / Homework):   Symptoms: Improving anxiety, not drinking alcohol    Homework: Achieved / completed to satisfaction      Episode of Care Goals: Satisfactory progress - ACTION (Actively working towards change); Intervened by reinforcing change plan / affirming steps taken     Current / Ongoing Stressors and Concerns:   Working to prioritize leisure plans (time outdoors), working on next steps for AA,leadership opps,  focus on relationship primary and others, treating self with  kindness     Treatment Objective(s) Addressed in This Session:   use thought-stopping strategy daily to reduce intrusive thoughts  Proactive communication to get needs met  [place] kindness meditation  Patient will maintain sobriety and attend AA weekly or more.  Work on steps  Get more deeply involved In AA     Intervention:   Interpersonal Therapy: Provided active listening and validation as patient reported on recent events. Processed insights. Celebrated success and new AA leadership opportunities.   EMDR: Reinforced calm safe state exercise. Started target identification process. Completed container exercise    Assessments completed prior to visit:  The following assessments were completed by patient for this visit:  PHQ2:       10/28/2024     9:55 AM 7/21/2024     3:59 PM 4/18/2024     7:28 PM 1/18/2023    12:43 PM 5/31/2022     2:19 PM 5/31/2022     2:16 PM 10/20/2020     1:54 PM   PHQ-2 ( 1999 [employer])   Q1: Little interest or pleasure in doing things 0 0 0 0 0 0 0   Q2: Feeling down, depressed or hopeless 0 0 0 0 0 0 0   PHQ-2 Score 0  0 0 0 0 0 0   PHQ-2 Total Score (12-17 Years)- Positive if 3 or more points; Administer PHQ-A if positive       0   Q1: Little interest or pleasure in doing things Not at all Not at all Not at all Not at all Not at all Not at all Not at all   Q2: Feeling down, depressed or hopeless Not at all Not at all Not at all Not at all Not at all Not at all Not at all   PHQ-2 Score 0 0 0 0 0 0 0       Patient-reported     PHQ9:       3/26/2018     8:27 AM 3/26/2018     8:28 AM 5/8/2018     3:56 PM 10/17/2018    11:47 AM 11/1/2019     2:14 PM 10/20/2020     1:51 PM 6/30/2024    10:23 AM   PHQ-9 SCORE   PHQ-9 Total Score MyChart 2 (Minimal depression)  1 (Minimal depression) 3 (Minimal depression) 2 (Minimal depression) 0 3 (Minimal depression)   PHQ-9 Total Score  2 1 3 2 0 3     GAD2:       6/30/2024    10:38 AM 8/13/2024     2:57 PM 9/16/2024     8:55 AM 10/28/2024     9:55 AM 12/18/2024      5:31 PM   KVNG-2   Feeling nervous, anxious, or on edge 1 0 0 0 0   Not being able to stop or control worrying 1 1 0 0 0   KVNG-2 Total Score 2 1 0 0  0        Patient-reported     GAD7:       5/8/2018     3:56 PM 10/17/2018    11:47 AM 11/1/2019     2:15 PM 10/20/2020     1:52 PM 5/31/2022     2:19 PM 6/10/2023    10:08 AM 4/18/2024     7:28 PM   KVNG-7 SCORE   Total Score 2 (minimal anxiety) 5 (mild anxiety) 4 (minimal anxiety) 2 (minimal anxiety) 2 (minimal anxiety) 0 (minimal anxiety) 2 (minimal anxiety)   Total Score 2 5 4 2 2 0 2     CAGE-AID:       6/30/2024    10:40 AM   CAGE-AID Total Score   Total Score 4   Total Score MyChart 4 (A total score of 2 or greater is considered clinically significant)     PROMIS 10-Global Health (only subscores and total score):       6/30/2024    10:39 AM 10/13/2024    10:27 AM   PROMIS-10 Scores Only   Global Mental Health Score 15 16   Global Physical Health Score 17 17   PROMIS TOTAL - SUBSCORES 32 33     [place] Suicide Severity Rating Scale (Lifetime/Recent)      7/1/2024    11:00 AM   [place] Suicide Severity Rating (Lifetime/Recent)   Q1 Wished to be Dead (Past Month) 0-->no   Q2 Suicidal Thoughts (Past Month) 0-->no   Q6 Suicide Behavior (Lifetime) 0-->no   Level of Risk per Screen no risks indicated         ASSESSMENT: Current Emotional / Mental Status (status of significant symptoms):   Risk status (Self / Other harm or suicidal ideation)   Patient denies current fears or concerns for personal safety.   Patient denies current or recent suicidal ideation or behaviors.   Patient denies current or recent homicidal ideation or behaviors.   Patient denies current or recent self injurious behavior or ideation.   Patient denies other safety concerns.   Patient reports there has been no change in risk factors since their last session.     Patient reports there has been no change in protective factors since their last session.     Recommended that patient call 911 or go to  the local ED should there be a change in any of these risk factors.     Appearance:   Appropriate    Eye Contact:   Good    Psychomotor Behavior: Normal    Attitude:   Cooperative  Friendly   Orientation:   All   Speech    Rate / Production: Normal/ Responsive Normal     Volume:  Normal    Mood:    Anxious    Affect:    Appropriate    Thought Content:  Clear    Thought Form:  Coherent  Goal Directed  Logical    Insight:    Good      Medication Review:   No changes to current psychiatric medication(s)     Medication Compliance:   Yes     Changes in Health Issues:   None reported     Chemical Use Review:   Substance Use: Currently not drinking, attend AA  3 days a week, has a sponsor     Tobacco Use: No current tobacco use.      Diagnosis:  1. Generalized anxiety disorder        Collateral Reports Completed:   Not Applicable    PLAN: (Patient Tasks / Therapist Tasks / Other)  Continue work on AA steps, attend meetings, continue return to office, use calm safe state/container, Chela, try loving kindness mindfulness meditation.     Sabra Carrion, [facility]  12/19/2024                                                Individual Treatment Plan    Patient's Name: Ashutosh Velasco  YOB: 1980    Date of Creation: 10/1/24  Date Treatment Plan Last Reviewed/Revised:     DSM5 Diagnoses: 300.02 (F41.1) Generalized Anxiety Disorder Alcohol use disorder  Psychosocial / Contextual Factors: trauma hx  PROMIS (reviewed every 90 days):     Referral / Collaboration:  Referral to another professional/service is not indicated at this time..    Anticipated number of session for this episode of care:  tbd  Anticipation frequency of session:  every 2-4 weeks as indicated  Anticipated Duration of each session: 53 or more minutes  Treatment plan will be reviewed in 90 days or when goals have been changed.       MeasurableTreatment Goal(s) related to diagnosis / functional impairment(s)  Goal 1: Patient will maintain sobriety    I will  know I've met my goal when I am consistent and sober.      Objective #A (Patient Action)    Patient will maintain sobriety and attend AA weekly or more .  Work on steps  Get more deeply involved In AA  Status: New - Date: Oct1, 2024      Intervention(s)  Therapist will teach  support patient's focus on AA .  Goal 2: Patient will address anxiety, using skills    I will know I've met my goal when anxiety is reduced and functioning improves.      Objective #A (Patient Action)    Status: New - Date: 10/1/24    Use thought-stopping strategy daily to reduce intrusive thoughts  Calm safe state  Proactive communication to get needs met  Prioritize healthy activities involving nature/outdoors.  Prioritize relationships  Practice mindfulness/grounding  [place] kindness meditation  Intervention(s)  Therapist will  teach skills and assign homework .      Patient has reviewed and agreed to the above plan.      Sabra Carrion, [facility]  October 1, 2024                                          ______________________________________________________________________

## 2025-01-28 ENCOUNTER — VIRTUAL VISIT (OUTPATIENT)
Dept: PSYCHOLOGY | Facility: CLINIC | Age: 45
End: 2025-01-28
Payer: COMMERCIAL

## 2025-01-28 DIAGNOSIS — F41.1 GENERALIZED ANXIETY DISORDER: Primary | ICD-10-CM

## 2025-01-28 PROCEDURE — 90834 PSYTX W PT 45 MINUTES: CPT | Mod: 95

## 2025-01-28 NOTE — PROGRESS NOTES
[facility]                                     Progress Note    Patient Name: Ashutosh Velasco  Date: 1/28/25         Service Type: Individual      Session Start Time: 10:00 am  Session End Time: 10:50 am     Session Length: 50 min    Session #: 10    Attendees: Client attended alone    Service Modality:  Video Visit:      Provider verified identity through the following two step process.  Patient provided:  Patient is known previously to provider    Telemedicine Visit: The patient's condition can be safely assessed and treated via synchronous audio and visual telemedicine encounter.      Reason for Telemedicine Visit: Patient has requested telehealth visit    Originating Site (Patient Location): Patient's home    Distant Site (Provider Location): Provider Remote Setting- Home Office    Consent:  The patient/guardian has verbally consented to: the potential risks and benefits of telemedicine (video visit) versus in person care; bill my insurance or make self-payment for services provided; and responsibility for payment of non-covered services.     Patient would like the video invitation sent by:  My Chart    Mode of Communication:  [facility]    Distant Location (Provider):  Off-site    As the provider I attest to compliance with applicable laws and regulations related to telemedicine.    DATA  Interactive Complexity: No  Crisis: No      Progress Since Last Session (Related to Symptoms / Goals / Homework):   Symptoms: Improving anxiety, not drinking alcohol    Homework: Achieved / completed to satisfaction      Episode of Care Goals: Satisfactory progress - ACTION (Actively working towards change); Intervened by reinforcing change plan / affirming steps taken     Current / Ongoing Stressors and Concerns:   Working to prioritize leisure plans, continuing AA,leadership, focus on relationship primary and others, treating self with kindness     Treatment Objective(s) Addressed in This  Session:   use thought-stopping strategy daily to reduce intrusive thoughts  Proactive communication to get needs met  [place] kindness meditation  Patient will maintain sobriety and attend AA weekly or more.  Work on steps  Get more deeply involved In AA     Intervention:   Interpersonal Therapy: Provided active listening and validation as patient reported on recent events. Processed insights and growth. Discussed mindfulness/meditation, focused on 5 senses practice  EMDR: Reinforced calm safe state and container exercise. Made plans to return to target identification process    Assessments completed prior to visit:  The following assessments were completed by patient for this visit:  PHQ2:       1/27/2025    12:20 PM 10/28/2024     9:55 AM 7/21/2024     3:59 PM 4/18/2024     7:28 PM 1/18/2023    12:43 PM 5/31/2022     2:19 PM 5/31/2022     2:16 PM   PHQ-2 ( 1999 [employer])   Q1: Little interest or pleasure in doing things 0 0 0 0 0 0 0   Q2: Feeling down, depressed or hopeless 0 0 0 0 0 0 0   PHQ-2 Score 0  0  0 0 0 0 0   Q1: Little interest or pleasure in doing things Not at all Not at all Not at all Not at all Not at all Not at all Not at all   Q2: Feeling down, depressed or hopeless Not at all Not at all Not at all Not at all Not at all Not at all Not at all   PHQ-2 Score 0 0 0 0 0 0 0       Patient-reported     PHQ9:       3/26/2018     8:27 AM 3/26/2018     8:28 AM 5/8/2018     3:56 PM 10/17/2018    11:47 AM 11/1/2019     2:14 PM 10/20/2020     1:51 PM 6/30/2024    10:23 AM   PHQ-9 SCORE   PHQ-9 Total Score MyChart 2 (Minimal depression)   1 (Minimal depression)  3 (Minimal depression)  2 (Minimal depression)  0 3 (Minimal depression)   PHQ-9 Total Score  2 1 3 2 0 3       Proxy-reported     GAD2:       6/30/2024    10:38 AM 8/13/2024     2:57 PM 9/16/2024     8:55 AM 10/28/2024     9:55 AM 12/18/2024     5:31 PM 1/27/2025    12:20 PM   KVNG-2   Feeling nervous, anxious, or on edge 1 0 0 0 0 0   Not being able to  stop or control worrying 1 1 0 0 0 0   KVNG-2 Total Score 2 1 0 0  0  0        Patient-reported     GAD7:       5/8/2018     3:56 PM 10/17/2018    11:47 AM 11/1/2019     2:15 PM 10/20/2020     1:52 PM 5/31/2022     2:19 PM 6/10/2023    10:08 AM 4/18/2024     7:28 PM   KVNG-7 SCORE   Total Score 2 (minimal anxiety)  5 (mild anxiety)  4 (minimal anxiety)  2 (minimal anxiety) 2 (minimal anxiety) 0 (minimal anxiety) 2 (minimal anxiety)   Total Score 2 5 4 2 2 0 2       Proxy-reported     CAGE-AID:       6/30/2024    10:40 AM   CAGE-AID Total Score   Total Score 4   Total Score MyChart 4 (A total score of 2 or greater is considered clinically significant)     PROMIS 10-Global Health (only subscores and total score):       6/30/2024    10:39 AM 10/13/2024    10:27 AM 1/27/2025    12:22 PM   PROMIS-10 Scores Only   Global Mental Health Score 15 16 17    Global Physical Health Score 17 17 17    PROMIS TOTAL - SUBSCORES 32 33 34        Patient-reported     [place] Suicide Severity Rating Scale (Lifetime/Recent)      7/1/2024    11:00 AM   [place] Suicide Severity Rating (Lifetime/Recent)   Q1 Wished to be Dead (Past Month) 0-->no   Q2 Suicidal Thoughts (Past Month) 0-->no   Q6 Suicide Behavior (Lifetime) 0-->no   Level of Risk per Screen no risks indicated         ASSESSMENT: Current Emotional / Mental Status (status of significant symptoms):   Risk status (Self / Other harm or suicidal ideation)   Patient denies current fears or concerns for personal safety.   Patient denies current or recent suicidal ideation or behaviors.   Patient denies current or recent homicidal ideation or behaviors.   Patient denies current or recent self injurious behavior or ideation.   Patient denies other safety concerns.   Patient reports there has been no change in risk factors since their last session.     Patient reports there has been no change in protective factors since their last session.     Recommended that patient call 911 or go to  the local ED should there be a change in any of these risk factors.     Appearance:   Appropriate    Eye Contact:   Good    Psychomotor Behavior: Normal    Attitude:   Cooperative  Friendly   Orientation:   All   Speech    Rate / Production: Normal/ Responsive Normal     Volume:  Normal    Mood:    Anxious    Affect:    Appropriate    Thought Content:  Clear    Thought Form:  Coherent  Goal Directed  Logical    Insight:    Good      Medication Review:   No changes to current psychiatric medication(s)     Medication Compliance:   Yes     Changes in Health Issues:   None reported     Chemical Use Review:   Substance Use: Currently not drinking, attend AA  3 days a week, has a sponsor     Tobacco Use: No current tobacco use.      Diagnosis:  No diagnosis found.      Collateral Reports Completed:   Not Applicable    PLAN: (Patient Tasks / Therapist Tasks / Other)  Continue work on AA steps, attend meetings, continue return to office, use calm safe state/container, Chela, try loving kindness mindfulness meditation, 5 senses mindfulness practice.     Sabra Carrion [facility]  .td                                                Individual Treatment Plan    Patient's Name: Ashutosh Velasco  YOB: 1980    Date of Creation: 10/1/24  Date Treatment Plan Last Reviewed/Revised:     DSM5 Diagnoses: 300.02 (F41.1) Generalized Anxiety Disorder Alcohol use disorder  Psychosocial / Contextual Factors: trauma hx  PROMIS (reviewed every 90 days):     Referral / Collaboration:  Referral to another professional/service is not indicated at this time..    Anticipated number of session for this episode of care:  tbd  Anticipation frequency of session:  every 2-4 weeks as indicated  Anticipated Duration of each session: 53 or more minutes  Treatment plan will be reviewed in 90 days or when goals have been changed.       MeasurableTreatment Goal(s) related to diagnosis / functional impairment(s)  Goal 1: Patient will maintain  sobriety    I will know I've met my goal when I am consistent and sober.      Objective #A (Patient Action)    Patient will maintain sobriety and attend AA weekly or more .  Work on steps  Get more deeply involved In AA  Status: New - Date: Oct1, 2024      Intervention(s)  Therapist will teach  support patient's focus on AA .  Goal 2: Patient will address anxiety, using skills    I will know I've met my goal when anxiety is reduced and functioning improves.      Objective #A (Patient Action)    Status: New - Date: 10/1/24    Use thought-stopping strategy daily to reduce intrusive thoughts  Calm safe state  Proactive communication to get needs met  Prioritize healthy activities involving nature/outdoors.  Prioritize relationships  Practice mindfulness/grounding  [place] kindness meditation  Intervention(s)  Therapist will  teach skills and assign homework .      Patient has reviewed and agreed to the above plan.      Sabra Carrion, [facility] HospitalSW  October 1, 2024                                          ______________________________________________________________________

## 2025-02-10 ENCOUNTER — VIRTUAL VISIT (OUTPATIENT)
Dept: PSYCHOLOGY | Facility: CLINIC | Age: 45
End: 2025-02-10
Payer: COMMERCIAL

## 2025-02-10 DIAGNOSIS — F41.1 GENERALIZED ANXIETY DISORDER: Primary | ICD-10-CM

## 2025-02-10 PROCEDURE — 90837 PSYTX W PT 60 MINUTES: CPT | Mod: 95

## 2025-02-10 NOTE — PROGRESS NOTES
[facility]                                     Progress Note    Patient Name: Ashutosh Velasco  Date: 2/10/25         Service Type: Individual      Session Start Time: 9:00 am  Session End Time: 9:53 am     Session Length: 53 min    Session #: 11    Attendees: Client attended alone    Service Modality:  Video Visit:      Provider verified identity through the following two step process.  Patient provided:  Patient is known previously to provider    Telemedicine Visit: The patient's condition can be safely assessed and treated via synchronous audio and visual telemedicine encounter.      Reason for Telemedicine Visit: Patient has requested telehealth visit    Originating Site (Patient Location): Patient's home    Distant Site (Provider Location): Provider Remote Setting- Home Office    Consent:  The patient/guardian has verbally consented to: the potential risks and benefits of telemedicine (video visit) versus in person care; bill my insurance or make self-payment for services provided; and responsibility for payment of non-covered services.     Patient would like the video invitation sent by:  My Chart    [facility]    Distant Location (Provider):  Off-site    As the provider I attest to compliance with applicable laws and regulations related to telemedicine.    DATA  Interactive Complexity: No  Crisis: No      Progress Since Last Session (Related to Symptoms / Goals / Homework):   Symptoms: Improving anxiety, not drinking alcohol    Homework: Achieved / completed to satisfaction      Episode of Care Goals: Satisfactory progress - ACTION (Actively working towards change); Intervened by reinforcing change plan / affirming steps taken     Current / Ongoing Stressors and Concerns:   Working to prioritize leisure plans, continuing AA,leadership, focus on relationship primary and others, treating self with kindness     Treatment Objective(s) Addressed in This  Session:   use thought-stopping strategy daily to reduce intrusive thoughts  Proactive communication to get needs met  [place] kindness meditation  Patient will maintain sobriety and attend AA weekly or more.  Work on steps  Get more deeply involved In AA     Intervention:   Interpersonal Therapy: Provided active listening and validation as patient reported on recent events. Processed insights and growth. Discussed mindfulness/meditation, focused on 5 senses practice  EMDR: Reinforced calm safe state and container exercise. Continued target identification process    Assessments completed prior to visit:  The following assessments were completed by patient for this visit:  PHQ2:       2/9/2025     4:09 PM 1/27/2025    12:20 PM 10/28/2024     9:55 AM 7/21/2024     3:59 PM 4/18/2024     7:28 PM 1/18/2023    12:43 PM 5/31/2022     2:19 PM   PHQ-2 ( 1999 [employer])   Q1: Little interest or pleasure in doing things 0 0 0 0 0 0 0   Q2: Feeling down, depressed or hopeless 0 0 0 0 0 0 0   PHQ-2 Score 0  0  0  0 0 0 0   Q1: Little interest or pleasure in doing things Not at all Not at all Not at all Not at all Not at all Not at all Not at all   Q2: Feeling down, depressed or hopeless Not at all Not at all Not at all Not at all Not at all Not at all Not at all   PHQ-2 Score 0 0 0 0 0 0 0       Patient-reported     PHQ9:       3/26/2018     8:27 AM 3/26/2018     8:28 AM 5/8/2018     3:56 PM 10/17/2018    11:47 AM 11/1/2019     2:14 PM 10/20/2020     1:51 PM 6/30/2024    10:23 AM   PHQ-9 SCORE   PHQ-9 Total Score MyChart 2 (Minimal depression)   1 (Minimal depression)  3 (Minimal depression)  2 (Minimal depression)  0 3 (Minimal depression)   PHQ-9 Total Score  2 1 3 2 0 3       Proxy-reported     GAD2:       6/30/2024    10:38 AM 8/13/2024     2:57 PM 9/16/2024     8:55 AM 10/28/2024     9:55 AM 12/18/2024     5:31 PM 1/27/2025    12:20 PM 2/9/2025     4:10 PM   KVNG-2   Feeling nervous, anxious, or on edge 1 0 0 0 0 0 0   Not being  able to stop or control worrying 1 1 0 0 0 0 0   KVNG-2 Total Score 2 1 0 0  0  0  0        Patient-reported     GAD7:       5/8/2018     3:56 PM 10/17/2018    11:47 AM 11/1/2019     2:15 PM 10/20/2020     1:52 PM 5/31/2022     2:19 PM 6/10/2023    10:08 AM 4/18/2024     7:28 PM   KVNG-7 SCORE   Total Score 2 (minimal anxiety)  5 (mild anxiety)  4 (minimal anxiety)  2 (minimal anxiety) 2 (minimal anxiety) 0 (minimal anxiety) 2 (minimal anxiety)   Total Score 2 5 4 2 2 0 2       Proxy-reported     CAGE-AID:       6/30/2024    10:40 AM   CAGE-AID Total Score   Total Score 4   Total Score MyChart 4 (A total score of 2 or greater is considered clinically significant)     PROMIS 10-Global Health (only subscores and total score):       6/30/2024    10:39 AM 10/13/2024    10:27 AM 1/27/2025    12:22 PM 2/9/2025     4:11 PM   PROMIS-10 Scores Only   Global Mental Health Score 15 16 17  16    Global Physical Health Score 17 17 17  18    PROMIS TOTAL - SUBSCORES 32 33 34  34        Patient-reported     [place] Suicide Severity Rating Scale (Lifetime/Recent)      7/1/2024    11:00 AM   [place] Suicide Severity Rating (Lifetime/Recent)   Q1 Wished to be Dead (Past Month) 0-->no   Q2 Suicidal Thoughts (Past Month) 0-->no   Q6 Suicide Behavior (Lifetime) 0-->no   Level of Risk per Screen no risks indicated         ASSESSMENT: Current Emotional / Mental Status (status of significant symptoms):   Risk status (Self / Other harm or suicidal ideation)   Patient denies current fears or concerns for personal safety.   Patient denies current or recent suicidal ideation or behaviors.   Patient denies current or recent homicidal ideation or behaviors.   Patient denies current or recent self injurious behavior or ideation.   Patient denies other safety concerns.   Patient reports there has been no change in risk factors since their last session.     Patient reports there has been no change in protective factors since their last session.      Recommended that patient call 911 or go to the local ED should there be a change in any of these risk factors.     Appearance:   Appropriate    Eye Contact:   Good    Psychomotor Behavior: Normal    Attitude:   Cooperative  Friendly   Orientation:   All   Speech    Rate / Production: Normal/ Responsive Normal     Volume:  Normal    Mood:    Anxious    Affect:    Appropriate    Thought Content:  Clear    Thought Form:  Coherent  Goal Directed  Logical    Insight:    Good      Medication Review:   No changes to current psychiatric medication(s)     Medication Compliance:   Yes     Changes in Health Issues:   None reported     Chemical Use Review:   Substance Use: Currently not drinking, attend AA  3 days a week, has a sponsor     Tobacco Use: No current tobacco use.      Diagnosis:  1. Generalized anxiety disorder          Collateral Reports Completed:   Not Applicable    PLAN: (Patient Tasks / Therapist Tasks / Other)  Continue work on AA steps, attend meetings, continue return to office, use calm safe state/container, Chela, try loving kindness mindfulness meditation, 5 senses mindfulness practice.     Sabra Carrion, [facility] 2/10/2025                                                  Individual Treatment Plan    Patient's Name: Ashutosh Velasco  YOB: 1980    Date of Creation: 10/1/24  Date Treatment Plan Last Reviewed/Revised:     DSM5 Diagnoses: 300.02 (F41.1) Generalized Anxiety Disorder Alcohol use disorder  Psychosocial / Contextual Factors: trauma hx  PROMIS (reviewed every 90 days):     Referral / Collaboration:  Referral to another professional/service is not indicated at this time..    Anticipated number of session for this episode of care:  tbd  Anticipation frequency of session:  every 2-4 weeks as indicated  Anticipated Duration of each session: 53 or more minutes  Treatment plan will be reviewed in 90 days or when goals have been changed.       MeasurableTreatment Goal(s) related to  diagnosis / functional impairment(s)  Goal 1: Patient will maintain sobriety    I will know I've met my goal when I am consistent and sober.      Objective #A (Patient Action)    Patient will maintain sobriety and attend AA weekly or more .  Work on steps  Get more deeply involved In AA  Status: New - Date: Oct1, 2024      Intervention(s)  Therapist will teach  support patient's focus on AA .  Goal 2: Patient will address anxiety, using skills    I will know I've met my goal when anxiety is reduced and functioning improves.      Objective #A (Patient Action)    Status: 2/10/2025   Use thought-stopping strategy daily to reduce intrusive thoughts  Calm safe state  Proactive communication to get needs met  Prioritize healthy activities involving nature/outdoors.  Prioritize relationships  Practice mindfulness/grounding  [place] kindness meditation  EMDR  Intervention(s)  Therapist will  teach skills and assign homework .      Patient has reviewed and agreed to the above plan.      Sabra Carrion, [facility]  2/10/2025                                            ______________________________________________________________________

## 2025-02-24 ENCOUNTER — VIRTUAL VISIT (OUTPATIENT)
Facility: CLINIC | Age: 45
End: 2025-02-24
Payer: COMMERCIAL

## 2025-02-24 DIAGNOSIS — F41.1 GENERALIZED ANXIETY DISORDER: Primary | ICD-10-CM

## 2025-02-24 PROCEDURE — 90837 PSYTX W PT 60 MINUTES: CPT | Mod: 95

## 2025-02-24 NOTE — PROGRESS NOTES
[facility]                                     Progress Note    Patient Name: Ashutosh Velasco  Date: 2/24/25         Service Type: Individual      Session Start Time: 9:00 am  Session End Time: 9:53 am     Session Length: 53 min    Session #: 12    Attendees: Client attended alone    Service Modality:  Video Visit:      Provider verified identity through the following two step process.  Patient provided:  Patient is known previously to provider    Telemedicine Visit: The patient's condition can be safely assessed and treated via synchronous audio and visual telemedicine encounter.      Reason for Telemedicine Visit: Patient has requested telehealth visit    Originating Site (Patient Location): Patient's home    Distant Site (Provider Location): Provider Remote Setting- Home Office    Consent:  The patient/guardian has verbally consented to: the potential risks and benefits of telemedicine (video visit) versus in person care; bill my insurance or make self-payment for services provided; and responsibility for payment of non-covered services.     Patient would like the video invitation sent by:  My Chart    Mode of Communication:  [facility]    Distant Location (Provider):  Off-site    As the provider I attest to compliance with applicable laws and regulations related to telemedicine.    DATA  Interactive Complexity: No  Crisis: No      Progress Since Last Session (Related to Symptoms / Goals / Homework):   Symptoms: Improving anxiety, not drinking alcohol    Homework: Achieved / completed to satisfaction      Episode of Care Goals: Satisfactory progress - ACTION (Actively working towards change); Intervened by reinforcing change plan / affirming steps taken     Current / Ongoing Stressors and Concerns:   Work stressors, impacted by changes in org and government. Working to prioritize leisure plans, continuing AA,leadership, focus on relationship primary and others, treating self  with kindness     Treatment Objective(s) Addressed in This Session:   use thought-stopping strategy daily to reduce intrusive thoughts  Proactive communication to get needs met  [place] kindness meditation  Patient will maintain sobriety and attend AA weekly or more.  Work on steps  Get more deeply involved In AA     Intervention:   Interpersonal Therapy: Provided active listening and validation as patient reported on recent events. Processed insights and growth. Discussed mindfulness/meditation, focused on 5 senses practice  EMDR: Reinforced calm safe state and container exercise. Completed target reprocessing, installation, closure    Assessments completed prior to visit:  The following assessments were completed by patient for this visit:  PHQ2:       2/23/2025     2:43 PM 2/9/2025     4:09 PM 1/27/2025    12:20 PM 10/28/2024     9:55 AM 7/21/2024     3:59 PM 4/18/2024     7:28 PM 1/18/2023    12:43 PM   PHQ-2 ( 1999 [employer])   Q1: Little interest or pleasure in doing things 0 0 0 0 0 0 0   Q2: Feeling down, depressed or hopeless 0 0 0 0 0 0 0   PHQ-2 Score 0  0  0  0  0 0 0   Q1: Little interest or pleasure in doing things Not at all Not at all Not at all Not at all Not at all Not at all Not at all   Q2: Feeling down, depressed or hopeless Not at all Not at all Not at all Not at all Not at all Not at all Not at all   PHQ-2 Score 0 0 0 0 0 0 0       Patient-reported     PHQ9:       3/26/2018     8:27 AM 3/26/2018     8:28 AM 5/8/2018     3:56 PM 10/17/2018    11:47 AM 11/1/2019     2:14 PM 10/20/2020     1:51 PM 6/30/2024    10:23 AM   PHQ-9 SCORE   PHQ-9 Total Score MyChart 2 (Minimal depression)   1 (Minimal depression)  3 (Minimal depression)  2 (Minimal depression)  0 3 (Minimal depression)   PHQ-9 Total Score  2 1 3 2 0 3       Proxy-reported     GAD2:       8/13/2024     2:57 PM 9/16/2024     8:55 AM 10/28/2024     9:55 AM 12/18/2024     5:31 PM 1/27/2025    12:20 PM 2/9/2025     4:10 PM 2/23/2025     2:43 PM    KVNG-2   Feeling nervous, anxious, or on edge 0 0 0 0 0 0 0   Not being able to stop or control worrying 1 0 0 0 0 0 0   KVNG-2 Total Score 1 0 0  0  0  0  0        Patient-reported     GAD7:       5/8/2018     3:56 PM 10/17/2018    11:47 AM 11/1/2019     2:15 PM 10/20/2020     1:52 PM 5/31/2022     2:19 PM 6/10/2023    10:08 AM 4/18/2024     7:28 PM   KVNG-7 SCORE   Total Score 2 (minimal anxiety)  5 (mild anxiety)  4 (minimal anxiety)  2 (minimal anxiety) 2 (minimal anxiety) 0 (minimal anxiety) 2 (minimal anxiety)   Total Score 2 5 4 2 2 0 2       Proxy-reported     CAGE-AID:       6/30/2024    10:40 AM   CAGE-AID Total Score   Total Score 4   Total Score MyChart 4 (A total score of 2 or greater is considered clinically significant)     PROMIS 10-Global Health (only subscores and total score):       6/30/2024    10:39 AM 10/13/2024    10:27 AM 1/27/2025    12:22 PM 2/9/2025     4:11 PM 2/23/2025     2:44 PM   PROMIS-10 Scores Only   Global Mental Health Score 15 16 17  16  15    Global Physical Health Score 17 17 17  18  18    PROMIS TOTAL - SUBSCORES 32 33 34  34  33        Patient-reported     [place] Suicide Severity Rating Scale (Lifetime/Recent)      7/1/2024    11:00 AM   [place] Suicide Severity Rating (Lifetime/Recent)   Q1 Wished to be Dead (Past Month) 0-->no   Q2 Suicidal Thoughts (Past Month) 0-->no   Q6 Suicide Behavior (Lifetime) 0-->no   Level of Risk per Screen no risks indicated         ASSESSMENT: Current Emotional / Mental Status (status of significant symptoms):   Risk status (Self / Other harm or suicidal ideation)   Patient denies current fears or concerns for personal safety.   Patient denies current or recent suicidal ideation or behaviors.   Patient denies current or recent homicidal ideation or behaviors.   Patient denies current or recent self injurious behavior or ideation.   Patient denies other safety concerns.   Patient reports there has been no change in risk factors since their  last session.     Patient reports there has been no change in protective factors since their last session.     Recommended that patient call 911 or go to the local ED should there be a change in any of these risk factors     Appearance:   Appropriate    Eye Contact:   Good    Psychomotor Behavior: Normal    Attitude:   Cooperative  Friendly   Orientation:   All   Speech    Rate / Production: Normal     Volume:  Normal    Mood:    Anxious    Affect:    Appropriate    Thought Content:  Clear    Thought Form:  Coherent  Goal Directed  Logical    Insight:    Good      Medication Review:   No changes to current psychiatric medication(s)     Medication Compliance:   Yes     Changes in Health Issues:   None reported     Chemical Use Review:   Substance Use: Currently not drinking, attend AA  3 days a week, has a sponsor     Tobacco Use: No current tobacco use.      Diagnosis:  1. Generalized anxiety disorder          Collateral Reports Completed:   Not Applicable    PLAN: (Patient Tasks / Therapist Tasks / Other)  Continue work on AA steps, attend meetings, continue return to office, use calm safe state/container, Chela, try loving kindness mindfulness meditation, 5 senses mindfulness practice, talk to wife about plans together, need for activity time.     Sabra Carrion, [facility] 2/24/2025                                                  Individual Treatment Plan    Patient's Name: Ashutosh Velasco  YOB: 1980    Date of Creation: 10/1/24  Date Treatment Plan Last Reviewed/Revised: 2/24/25    DSM5 Diagnoses: 300.02 (F41.1) Generalized Anxiety Disorder Alcohol use disorder  Psychosocial / Contextual Factors: trauma hx  PROMIS (reviewed every 90 days):     Referral / Collaboration:  Referral to another professional/service is not indicated at this time..    Anticipated number of session for this episode of care:  tbd  Anticipation frequency of session:  every 2-4 weeks as indicated  Anticipated Duration of each  session: 53 or more minutes  Treatment plan will be reviewed in 90 days or when goals have been changed.       MeasurableTreatment Goal(s) related to diagnosis / functional impairment(s)  Goal 1: Patient will maintain sobriety    I will know I've met my goal when I am consistent and sober.      Objective #A (Patient Action)    Patient will maintain sobriety and attend AA weekly or more .  Work on steps  Get more deeply involved In AA  Status:  2/10/25      Intervention(s)  Therapist will teach  support patient's focus on AA .  Goal 2: Patient will address anxiety, using skills    I will know I've met my goal when anxiety is reduced and functioning improves.      Objective #A (Patient Action)    Status: 2/10/2025   Use thought-stopping strategy daily to reduce intrusive thoughts  Calm safe state  Proactive communication to get needs met  Prioritize healthy activities involving nature/outdoors.  Prioritize relationships  Practice mindfulness/grounding  [place] kindness meditation  EMDR  Intervention(s)  Therapist will  teach skills and assign homework .      Patient has reviewed and agreed to the above plan.      Sabra Carrion, [facility]  2/10/2025                                            ______________________________________________________________________

## 2025-03-10 ENCOUNTER — VIRTUAL VISIT (OUTPATIENT)
Facility: CLINIC | Age: 45
End: 2025-03-10
Payer: COMMERCIAL

## 2025-03-10 DIAGNOSIS — F41.1 GENERALIZED ANXIETY DISORDER: Primary | ICD-10-CM

## 2025-03-10 PROCEDURE — 90837 PSYTX W PT 60 MINUTES: CPT | Mod: 95

## 2025-03-10 NOTE — PROGRESS NOTES
[facility]                                     Progress Note    Patient Name: Ashutosh Velasco  Date: 3/10/25         Service Type: Individual      Session Start Time: 9:00 am  Session End Time: 9:53 am     Session Length: 53 min    Session #: 13    Attendees: Client attended alone    Service Modality:  Video Visit:      Provider verified identity through the following two step process.  Patient provided:  Patient is known previously to provider    Telemedicine Visit: The patient's condition can be safely assessed and treated via synchronous audio and visual telemedicine encounter.      Reason for Telemedicine Visit: Patient has requested telehealth visit    Originating Site (Patient Location): Patient's home    Distant Site (Provider Location): Provider Remote Setting- Home Office    Consent:  The patient/guardian has verbally consented to: the potential risks and benefits of telemedicine (video visit) versus in person care; bill my insurance or make self-payment for services provided; and responsibility for payment of non-covered services.     Patient would like the video invitation sent by:  My Chart    Mode of Communication:  [facility]    Distant Location (Provider):  Off-site    As the provider I attest to compliance with applicable laws and regulations related to telemedicine.    DATA  Interactive Complexity: No  Crisis: No     Extended Session (53+ minutes): PROLONGED SERVICE IN THE OUTPATIENT SETTING REQUIRING DIRECT (FACE-TO-FACE) PATIENT CONTACT BEYOND THE USUAL SERVICE:    - Patient's presenting concerns require more intensive intervention than could be completed within the usual service  Interactive Complexity: No  Crisis: No     Progress Since Last Session (Related to Symptoms / Goals / Homework):   Symptoms: Improving anxiety, not drinking alcohol    Homework: Achieved / completed to satisfaction      Episode of Care Goals: Satisfactory progress - ACTION (Actively  working towards change); Intervened by reinforcing change plan / affirming steps taken     Current / Ongoing Stressors and Concerns:   Work stressors. Concerns about wife's mental health/work stressors. Working to prioritize leisure plans, continuing AA,leadership, focus on relationship primary and others, treating self with kindness     Treatment Objective(s) Addressed in This Session:   Continue AA  Use thought-stopping strategy daily to reduce intrusive thoughts  Calm safe state  Proactive communication to get needs met  Prioritize healthy activities involving nature/outdoors.  Prioritize relationships  Practice mindfulness/grounding  [place] kindness meditation     Intervention:   Interpersonal Therapy: Provided active listening and validation as patient reported on recent events. Processed insights and growth. Surfaced strategies to engage in enjoyable activities with wife, refocus time toward wellbeing, communication to get needs met  Validated recent experiences and insights regarding relational dynamic      Assessments completed prior to visit:  The following assessments were completed by patient for this visit:  PHQ2:       2/23/2025     2:43 PM 2/9/2025     4:09 PM 1/27/2025    12:20 PM 10/28/2024     9:55 AM 7/21/2024     3:59 PM 4/18/2024     7:28 PM 1/18/2023    12:43 PM   PHQ-2 ( 1999 [employer])   Q1: Little interest or pleasure in doing things 0 0 0 0 0 0 0   Q2: Feeling down, depressed or hopeless 0 0 0 0 0 0 0   PHQ-2 Score 0  0  0  0  0 0 0   Q1: Little interest or pleasure in doing things Not at all Not at all Not at all Not at all Not at all Not at all Not at all   Q2: Feeling down, depressed or hopeless Not at all Not at all Not at all Not at all Not at all Not at all Not at all   PHQ-2 Score 0 0 0 0 0 0 0       Patient-reported     PHQ9:       3/26/2018     8:27 AM 3/26/2018     8:28 AM 5/8/2018     3:56 PM 10/17/2018    11:47 AM 11/1/2019     2:14 PM 10/20/2020     1:51 PM 6/30/2024    10:23 AM    PHQ-9 SCORE   PHQ-9 Total Score MyChart 2 (Minimal depression)   1 (Minimal depression)  3 (Minimal depression)  2 (Minimal depression)  0 3 (Minimal depression)   PHQ-9 Total Score  2 1 3 2 0 3       Proxy-reported     GAD2:       8/13/2024     2:57 PM 9/16/2024     8:55 AM 10/28/2024     9:55 AM 12/18/2024     5:31 PM 1/27/2025    12:20 PM 2/9/2025     4:10 PM 2/23/2025     2:43 PM   KVNG-2   Feeling nervous, anxious, or on edge 0 0 0 0 0 0 0   Not being able to stop or control worrying 1 0 0 0 0 0 0   KVNG-2 Total Score 1 0 0  0  0  0  0        Patient-reported     GAD7:       5/8/2018     3:56 PM 10/17/2018    11:47 AM 11/1/2019     2:15 PM 10/20/2020     1:52 PM 5/31/2022     2:19 PM 6/10/2023    10:08 AM 4/18/2024     7:28 PM   KVNG-7 SCORE   Total Score 2 (minimal anxiety)  5 (mild anxiety)  4 (minimal anxiety)  2 (minimal anxiety) 2 (minimal anxiety) 0 (minimal anxiety) 2 (minimal anxiety)   Total Score 2 5 4 2 2 0 2       Proxy-reported     CAGE-AID:       6/30/2024    10:40 AM   CAGE-AID Total Score   Total Score 4   Total Score MyChart 4 (A total score of 2 or greater is considered clinically significant)     PROMIS 10-Global Health (only subscores and total score):       6/30/2024    10:39 AM 10/13/2024    10:27 AM 1/27/2025    12:22 PM 2/9/2025     4:11 PM 2/23/2025     2:44 PM   PROMIS-10 Scores Only   Global Mental Health Score 15 16 17  16  15    Global Physical Health Score 17 17 17  18  18    PROMIS TOTAL - SUBSCORES 32 33 34  34  33        Patient-reported     [place] Suicide Severity Rating Scale (Lifetime/Recent)      7/1/2024    11:00 AM   [place] Suicide Severity Rating (Lifetime/Recent)   Q1 Wished to be Dead (Past Month) 0-->no   Q2 Suicidal Thoughts (Past Month) 0-->no   Q6 Suicide Behavior (Lifetime) 0-->no   Level of Risk per Screen no risks indicated         ASSESSMENT: Current Emotional / Mental Status (status of significant symptoms):   Risk status (Self / Other harm or suicidal  ideation)   Patient denies current fears or concerns for personal safety.   Patient denies current or recent suicidal ideation or behaviors.   Patient denies current or recent homicidal ideation or behaviors.   Patient denies current or recent self injurious behavior or ideation.   Patient denies other safety concerns.   Patient reports there has been no change in risk factors since their last session.     Patient reports there has been no change in protective factors since their last session.     Recommended that patient call 911 or go to the local ED should there be a change in any of these risk factors     Appearance:   Appropriate    Eye Contact:   Good    Psychomotor Behavior: Normal    Attitude:   Cooperative  Friendly   Orientation:   All   Speech    Rate / Production: Normal     Volume:  Normal    Mood:    Anxious    Affect:    Appropriate    Thought Content:  Clear    Thought Form:  Coherent  Goal Directed  Logical    Insight:    Good      Medication Review:   No changes to current psychiatric medication(s)     Medication Compliance:   Yes     Changes in Health Issues:   None reported     Chemical Use Review:   Substance Use: Currently not drinking, attend AA  3 days a week, has a sponsor     Tobacco Use: No current tobacco use.      Diagnosis:  1. Generalized anxiety disorder          Collateral Reports Completed:   Not Applicable    PLAN: (Patient Tasks / Therapist Tasks / Other)  Continue work on AA steps, attend meetings, continue return to office, use calm safe state/container, Chela, try loving kindness mindfulness meditation, 5 senses mindfulness practice, talk to wife about plans together, need for activity time. Research and plan enjoyable activities, engage in emotional boundaries.    Sabra Carrion, [facility]   3/10/2025                                                  Individual Treatment Plan    Patient's Name: Ashutosh Velasco  YOB: 1980    Date of Creation: 10/1/24  Date Treatment  Plan Last Reviewed/Revised: 2/24/25    DSM5 Diagnoses: 300.02 (F41.1) Generalized Anxiety Disorder Alcohol use disorder  Psychosocial / Contextual Factors: trauma hx  PROMIS (reviewed every 90 days):     Referral / Collaboration:  Referral to another professional/service is not indicated at this time..    Anticipated number of session for this episode of care:  tbd  Anticipation frequency of session:  every 2-4 weeks as indicated  Anticipated Duration of each session: 53 or more minutes  Treatment plan will be reviewed in 90 days or when goals have been changed.       MeasurableTreatment Goal(s) related to diagnosis / functional impairment(s)  Goal 1: Patient will maintain sobriety    I will know I've met my goal when I am consistent and sober.      Objective #A (Patient Action)    Patient will maintain sobriety and attend AA weekly or more .  Work on steps  Get more deeply involved In AA  Status:  2/10/25      Intervention(s)  Therapist will teach  support patient's focus on AA .  Goal 2: Patient will address anxiety, using skills    I will know I've met my goal when anxiety is reduced and functioning improves.      Objective #A (Patient Action)    Status: 2/10/2025   Use thought-stopping strategy daily to reduce intrusive thoughts  Calm safe state  Proactive communication to get needs met  Prioritize healthy activities involving nature/outdoors.  Prioritize relationships  Practice mindfulness/grounding  [place] kindness meditation  EMDR  Intervention(s)  Therapist will  teach skills and assign homework .      Patient has reviewed and agreed to the above plan.      Sabra Carrion, LICSW  2/10/2025                                            ______________________________________________________________________

## 2025-04-07 ENCOUNTER — VIRTUAL VISIT (OUTPATIENT)
Facility: CLINIC | Age: 45
End: 2025-04-07
Payer: COMMERCIAL

## 2025-04-07 DIAGNOSIS — F41.1 GENERALIZED ANXIETY DISORDER: Primary | ICD-10-CM

## 2025-04-07 PROCEDURE — 90837 PSYTX W PT 60 MINUTES: CPT | Mod: 95

## 2025-04-07 NOTE — PROGRESS NOTES
[facility]                                     Progress Note    Patient Name: Ashutosh Velasco  Date: 4/7/25         Service Type: Individual      Session Start Time: 9:00 am  Session End Time: 9:53 am     Session Length: 53 min    Session #: 14    Attendees: Client attended alone    Service Modality:  Video Visit:      Provider verified identity through the following two step process.  Patient provided:  Patient is known previously to provider    Telemedicine Visit: The patient's condition can be safely assessed and treated via synchronous audio and visual telemedicine encounter.      Reason for Telemedicine Visit: Patient has requested telehealth visit    Originating Site (Patient Location): Patient's home    Distant Site (Provider Location): Provider Remote Setting- Home Office    Consent:  The patient/guardian has verbally consented to: the potential risks and benefits of telemedicine (video visit) versus in person care; bill my insurance or make self-payment for services provided; and responsibility for payment of non-covered services.     Patient would like the video invitation sent by:  My Chart    Mode of [facility]:  [facility]    Distant Location (Provider):  Off-site    As the provider I attest to compliance with applicable laws and regulations related to telemedicine.    DATA  Interactive Complexity: No  Crisis: No     Extended Session (53+ minutes): PROLONGED SERVICE IN THE OUTPATIENT SETTING REQUIRING DIRECT (FACE-TO-FACE) PATIENT CONTACT BEYOND THE USUAL SERVICE:    - Patient's presenting concerns require more intensive intervention than could be completed within the usual service  Interactive Complexity: No  Crisis: No     Progress Since Last Session (Related to Symptoms / Goals / Homework):   Symptoms: Improving anxiety, not drinking alcohol    Homework: Achieved / completed to satisfaction      Episode of Care Goals: Satisfactory progress - ACTION (Actively  working towards change); Intervened by reinforcing change plan / affirming steps taken     Current / Ongoing Stressors and Concerns:   Work stressors. Concerns about wife's mental health/work stressors. Working to prioritize leisure plans, continuing AA,leadership, focus on relationship primary and others, treating self with kindness     Treatment Objective(s) Addressed in This Session:   Continue AA  Use thought-stopping strategy daily to reduce intrusive thoughts  Calm safe state  Proactive communication to get needs met  Prioritize healthy activities involving nature/outdoors.  Prioritize relationships  Practice mindfulness/grounding  [place] kindness meditation     Intervention:   Interpersonal Therapy: Provided active listening and validation as patient reported on recent events. Processed insights and growth. Surfaced strategies to engage in enjoyable activities with wife, refocus time toward wellbeing, communication to get needs met  Validated recent experiences and insights regarding relational dynamic.  Mindfulness meditation psychoeducation.  Led mindful morning meditation. Identified additional avenues for integration of practice      Assessments completed prior to visit:  The following assessments were completed by patient for this visit:  PHQ2:       2/23/2025     2:43 PM 2/9/2025     4:09 PM 1/27/2025    12:20 PM 10/28/2024     9:55 AM 7/21/2024     3:59 PM 4/18/2024     7:28 PM 1/18/2023    12:43 PM   PHQ-2 ( 1999 [employer])   Q1: Little interest or pleasure in doing things 0 0 0 0 0 0 0   Q2: Feeling down, depressed or hopeless 0 0 0 0 0 0 0   PHQ-2 Score 0  0  0  0  0 0 0   Q1: Little interest or pleasure in doing things Not at all Not at all Not at all Not at all Not at all Not at all Not at all   Q2: Feeling down, depressed or hopeless Not at all Not at all Not at all Not at all Not at all Not at all Not at all   PHQ-2 Score 0 0 0 0 0 0 0       Patient-reported     PHQ9:       3/26/2018     8:27 AM  3/26/2018     8:28 AM 5/8/2018     3:56 PM 10/17/2018    11:47 AM 11/1/2019     2:14 PM 10/20/2020     1:51 PM 6/30/2024    10:23 AM   PHQ-9 SCORE   PHQ-9 Total Score MyChart 2 (Minimal depression)   1 (Minimal depression)  3 (Minimal depression)  2 (Minimal depression)  0 3 (Minimal depression)   PHQ-9 Total Score  2 1 3 2 0 3       Proxy-reported     GAD2:       9/16/2024     8:55 AM 10/28/2024     9:55 AM 12/18/2024     5:31 PM 1/27/2025    12:20 PM 2/9/2025     4:10 PM 2/23/2025     2:43 PM 4/7/2025     7:41 AM   KVNG-2   Feeling nervous, anxious, or on edge 0 0 0 0 0 0 0   Not being able to stop or control worrying 0 0 0 0 0 0 0   KVNG-2 Total Score 0 0  0  0  0  0  0        Patient-reported     GAD7:       5/8/2018     3:56 PM 10/17/2018    11:47 AM 11/1/2019     2:15 PM 10/20/2020     1:52 PM 5/31/2022     2:19 PM 6/10/2023    10:08 AM 4/18/2024     7:28 PM   KVNG-7 SCORE   Total Score 2 (minimal anxiety)  5 (mild anxiety)  4 (minimal anxiety)  2 (minimal anxiety) 2 (minimal anxiety) 0 (minimal anxiety) 2 (minimal anxiety)   Total Score 2 5 4 2 2 0 2       Proxy-reported     CAGE-AID:       6/30/2024    10:40 AM   CAGE-AID Total Score   Total Score 4   Total Score MyChart 4 (A total score of 2 or greater is considered clinically significant)     PROMIS 10-Global Health (only subscores and total score):       6/30/2024    10:39 AM 10/13/2024    10:27 AM 1/27/2025    12:22 PM 2/9/2025     4:11 PM 2/23/2025     2:44 PM   PROMIS-10 Scores Only   Global Mental Health Score 15 16 17  16  15    Global Physical Health Score 17 17 17  18  18    PROMIS TOTAL - SUBSCORES 32 33 34  34  33        Patient-reported     [place] Suicide Severity Rating Scale (Lifetime/Recent)      7/1/2024    11:00 AM   [place] Suicide Severity Rating (Lifetime/Recent)   Q1 Wished to be Dead (Past Month) 0-->no   Q2 Suicidal Thoughts (Past Month) 0-->no   Q6 Suicide Behavior (Lifetime) 0-->no   Level of Risk per Screen no risks indicated          ASSESSMENT: Current Emotional / Mental Status (status of significant symptoms):   Risk status (Self / Other harm or suicidal ideation)   Patient denies current fears or concerns for personal safety.   Patient denies current or recent suicidal ideation or behaviors.   Patient denies current or recent homicidal ideation or behaviors.   Patient denies current or recent self injurious behavior or ideation.   Patient denies other safety concerns.   Patient reports there has been no change in risk factors since their last session.     Patient reports there has been no change in protective factors since their last session.     Recommended that patient call 911 or go to the local ED should there be a change in any of these risk factors     Appearance:   Appropriate    Eye Contact:   Good    Psychomotor Behavior: Normal    Attitude:   Cooperative  Friendly   Orientation:   All   Speech    Rate / Production: Normal     Volume:  Normal    Mood:    Anxious    Affect:    Appropriate    Thought Content:  Clear    Thought Form:  Coherent  Goal Directed  Logical    Insight:    Good      Medication Review:   No changes to current psychiatric medication(s)     Medication Compliance:   Yes     Changes in Health Issues:   None reported     Chemical Use Review:   Substance Use: Currently not drinking, attend AA  3 days a week, has a sponsor     Tobacco Use: No current tobacco use.      Diagnosis:  1. Generalized anxiety disorder          Collateral Reports Completed:   Not Applicable    PLAN: (Patient Tasks / Therapist Tasks / Other)  Continue work on AA steps, attend meetings, continue return to office, use calm safe state/container, Chela, try loving kindness mindfulness meditation, 5 senses mindfulness practice, talk to wife about plans together, need for activity time. Research and plan enjoyable activities, engage in emotional boundaries.  Practice mindfulness meditation in am, consider add'l other resources    Sabra  Murali, [facility]   4/7/2025                                                  Individual Treatment Plan    Patient's Name: Ashutosh Velasco  YOB: 1980    Date of Creation: 10/1/24  Date Treatment Plan Last Reviewed/Revised: 2/24/25    DSM5 Diagnoses: 300.02 (F41.1) Generalized Anxiety Disorder Alcohol use disorder  Psychosocial / Contextual Factors: trauma hx  PROMIS (reviewed every 90 days):     Referral / Collaboration:  Referral to another professional/service is not indicated at this time..    Anticipated number of session for this episode of care:  tbd  Anticipation frequency of session:  every 2-4 weeks as indicated  Anticipated Duration of each session: 53 or more minutes  Treatment plan will be reviewed in 90 days or when goals have been changed.       MeasurableTreatment Goal(s) related to diagnosis / functional impairment(s)  Goal 1: Patient will maintain sobriety    I will know I've met my goal when I am consistent and sober.      Objective #A (Patient Action)    Patient will maintain sobriety and attend AA weekly or more .  Work on steps  Get more deeply involved In AA  Status:  2/10/25      Intervention(s)  Therapist will teach  support patient's focus on AA .  Goal 2: Patient will address anxiety, using skills    I will know I've met my goal when anxiety is reduced and functioning improves.      Objective #A (Patient Action)    Status: 2/10/2025   Use thought-stopping strategy daily to reduce intrusive thoughts  Calm safe state  Proactive communication to get needs met  Prioritize healthy activities involving nature/outdoors.  Prioritize relationships  Practice mindfulness/grounding  [place] kindness meditation  EMDR  Intervention(s)  Therapist will  teach skills and assign homework .      Patient has reviewed and agreed to the above plan.      Sabra Carrion, [facility]  2/10/2025                                            ______________________________________________________________________

## 2025-04-24 DIAGNOSIS — F41.1 GENERALIZED ANXIETY DISORDER: ICD-10-CM

## 2025-04-28 RX ORDER — CITALOPRAM HYDROBROMIDE 20 MG/1
20 TABLET ORAL DAILY
Qty: 90 TABLET | Refills: 3 | OUTPATIENT
Start: 2025-04-28

## 2025-05-07 ENCOUNTER — VIRTUAL VISIT (OUTPATIENT)
Facility: CLINIC | Age: 45
End: 2025-05-07
Payer: COMMERCIAL

## 2025-05-07 DIAGNOSIS — F41.1 GENERALIZED ANXIETY DISORDER: Primary | ICD-10-CM

## 2025-05-07 PROCEDURE — 90837 PSYTX W PT 60 MINUTES: CPT | Mod: 95

## 2025-05-07 NOTE — PROGRESS NOTES
[facility]                                     Progress Note    Patient Name: Ashutosh Velasco  Date: 5/7/25         Service Type: Individual      Session Start Time:  11:00 am  Session End Time: 11:53 am     Session Length: 53 min    Session #: 15    Attendees: Client attended alone    Service Modality:  Video Visit:      Provider verified identity through the following two step process.  Patient provided:  Patient is known previously to provider    Telemedicine Visit: The patient's condition can be safely assessed and treated via synchronous audio and visual telemedicine encounter.      Reason for Telemedicine Visit: Patient has requested telehealth visit    Originating Site (Patient Location): Patient's home    Distant Site (Provider Location): Provider Remote Setting- Home Office    Consent:  The patient/guardian has verbally consented to: the potential risks and benefits of telemedicine (video visit) versus in person care; bill my insurance or make self-payment for services provided; and responsibility for payment of non-covered services.     Patient would like the video invitation sent by:  My Chart    [facility]:  [facility]    Distant Location (Provider):  Off-site    As the provider I attest to compliance with applicable laws and regulations related to telemedicine.    DATA  Interactive Complexity: No  Crisis: No     Extended Session (53+ minutes): PROLONGED SERVICE IN THE OUTPATIENT SETTING REQUIRING DIRECT (FACE-TO-FACE) PATIENT CONTACT BEYOND THE USUAL SERVICE:    - Patient's presenting concerns require more intensive intervention than could be completed within the usual service  Interactive Complexity: No  Crisis: No     Progress Since Last Session (Related to Symptoms / Goals / Homework):   Symptoms: Improving anxiety, not drinking alcohol    Homework: Achieved / completed to satisfaction      Episode of Care Goals: Satisfactory progress - ACTION (Actively  working towards change); Intervened by reinforcing change plan / affirming steps taken     Current / Ongoing Stressors and Concerns:   Work stressors. Concerns about wife's mental health/work stressors. Working to prioritize leisure plans, continuing AA,leadership, focus on relationship primary and others, treating self with kindness     Treatment Objective(s) Addressed in This Session:   Continue AA  Use thought-stopping strategy daily to reduce intrusive thoughts  Calm safe state  Proactive communication to get needs met  Prioritize healthy activities involving nature/outdoors.  Prioritize relationships  Practice mindfulness/grounding  [place] kindness meditation     Intervention:   Interpersonal Therapy: Provided active listening and validation as patient reported on recent events. Processed insights and growth.  Celebrated proactive communication in work role, enjoyable activities with wife, refocus time toward wellbeing, ability to get needs met Validated recent experiences and insights regarding relational dynamic. Mindfulness meditation/morning meditation reinforcement. Identified additional avenues for integration of practice    Assessments completed prior to visit:  The following assessments were completed by patient for this visit:  PHQ2:       4/24/2025     2:18 PM 2/23/2025     2:43 PM 2/9/2025     4:09 PM 1/27/2025    12:20 PM 10/28/2024     9:55 AM 7/21/2024     3:59 PM 4/18/2024     7:28 PM   PHQ-2 ( 1999 [employer])   Q1: Little interest or pleasure in doing things 0 0 0 0 0 0 0   Q2: Feeling down, depressed or hopeless 0 0 0 0 0 0 0   PHQ-2 Score 0  0  0  0  0  0 0   Q1: Little interest or pleasure in doing things Not at all Not at all Not at all Not at all Not at all Not at all Not at all   Q2: Feeling down, depressed or hopeless Not at all Not at all Not at all Not at all Not at all Not at all Not at all   PHQ-2 Score 0 0 0 0 0 0 0       Patient-reported     PHQ9:       3/26/2018     8:27 AM 3/26/2018      8:28 AM 5/8/2018     3:56 PM 10/17/2018    11:47 AM 11/1/2019     2:14 PM 10/20/2020     1:51 PM 6/30/2024    10:23 AM   PHQ-9 SCORE   PHQ-9 Total Score MyChart 2 (Minimal depression)   1 (Minimal depression)  3 (Minimal depression)  2 (Minimal depression)  0 3 (Minimal depression)   PHQ-9 Total Score  2  1  3  2 0 3       Proxy-reported    Data saved with a previous flowsheet row definition     GAD2:       9/16/2024     8:55 AM 10/28/2024     9:55 AM 12/18/2024     5:31 PM 1/27/2025    12:20 PM 2/9/2025     4:10 PM 2/23/2025     2:43 PM 4/7/2025     7:41 AM   KVNG-2   Feeling nervous, anxious, or on edge 0 0 0 0 0 0 0   Not being able to stop or control worrying 0 0 0 0 0 0 0   KVNG-2 Total Score 0 0  0  0  0  0  0        Patient-reported     GAD7:       10/17/2018    11:47 AM 11/1/2019     2:15 PM 10/20/2020     1:52 PM 5/31/2022     2:19 PM 6/10/2023    10:08 AM 4/18/2024     7:28 PM 4/20/2025    10:37 AM   KVNG-7 SCORE   Total Score 5 (mild anxiety)  4 (minimal anxiety)  2 (minimal anxiety) 2 (minimal anxiety) 0 (minimal anxiety) 2 (minimal anxiety) 1 (minimal anxiety)   Total Score 5 4 2 2 0 2 1        Patient-reported    Proxy-reported     CAGE-AID:       6/30/2024    10:40 AM   CAGE-AID Total Score   Total Score 4   Total Score MyChart 4 (A total score of 2 or greater is considered clinically significant)     PROMIS 10-Global Health (only subscores and total score):       6/30/2024    10:39 AM 10/13/2024    10:27 AM 1/27/2025    12:22 PM 2/9/2025     4:11 PM 2/23/2025     2:44 PM   PROMIS-10 Scores Only   Global Mental Health Score 15 16 17  16  15    Global Physical Health Score 17 17 17  18  18    PROMIS TOTAL - SUBSCORES 32 33 34  34  33        Patient-reported     [place] Suicide Severity Rating Scale (Lifetime/Recent)      7/1/2024    11:00 AM   [place] Suicide Severity Rating (Lifetime/Recent)   Q1 Wished to be Dead (Past Month) 0-->no   Q2 Suicidal Thoughts (Past Month) 0-->no   Q6 Suicide Behavior  (Lifetime) 0-->no   Level of Risk per Screen no risks indicated         ASSESSMENT: Current Emotional / Mental Status (status of significant symptoms):   Risk status (Self / Other harm or suicidal ideation)   Patient denies current fears or concerns for personal safety.   Patient denies current or recent suicidal ideation or behaviors.   Patient denies current or recent homicidal ideation or behaviors.   Patient denies current or recent self injurious behavior or ideation.   Patient denies other safety concerns.   Patient reports there has been no change in risk factors since their last session.     Patient reports there has been no change in protective factors since their last session.     Recommended that patient call 911 or go to the local ED should there be a change in any of these risk factors     Appearance:   Appropriate    Eye Contact:   Good    Psychomotor Behavior: Normal    Attitude:   Cooperative  Friendly   Orientation:   All   Speech    Rate / Production: Normal     Volume:  Normal    Mood:    Anxious    Affect:    Appropriate    Thought Content:  Clear    Thought Form:  Coherent  Goal Directed  Logical    Insight:    Good      Medication Review:   No changes to current psychiatric medication(s)     Medication Compliance:   Yes     Changes in Health Issues:   None reported     Chemical Use Review:   Substance Use: Currently not drinking, attend AA  3 days a week, has a sponsor     Tobacco Use: No current tobacco use.      Diagnosis:  1. Generalized anxiety disorder          Collateral Reports Completed:   Not Applicable    PLAN: (Patient Tasks / Therapist Tasks / Other)  Continue work on AA steps, attend meetings, continue return to office, use calm safe state/container, Chela, try loving kindness mindfulness meditation, 5 senses mindfulness practice, talk to wife about plans together, need for activity time. Research and plan enjoyable activities, engage in emotional boundaries.  Practice  mindfulness meditation in am, consider add'l other resources    KOTA Bronson   5/7/2025                                                  Individual Treatment Plan    Patient's Name: Ashutosh Velasco  YOB: 1980    Date of Creation: 10/1/24  Date Treatment Plan Last Reviewed/Revised: 5/7/25    DSM5 Diagnoses: 300.02 (F41.1) Generalized Anxiety Disorder Alcohol use disorder  Psychosocial / Contextual Factors: trauma hx  PROMIS (reviewed every 90 days):     Referral / Collaboration:  Referral to another professional/service is not indicated at this time.    Anticipated number of session for this episode of care: tbd  Anticipation frequency of session: every 2-4 weeks as indicated  Anticipated Duration of each session: 53 or more minutes  Treatment plan will be reviewed in 90 days or when goals have been changed.     MeasurableTreatment Goal(s) related to diagnosis / functional impairment(s)  Goal 1: Patient will maintain sobriety    I will know I've met my goal when I am consistent and sober.      Objective #A (Patient Action)    Patient will maintain sobriety and attend AA weekly 2x or more.  Work on steps  Get more deeply involved In AA  Status: 5/7/2025     Intervention(s)  Therapist will teach support patient's focus on AA.  Goal 2: Patient will address anxiety, using skills    I will know I've met my goal when anxiety is reduced and functioning improves.      Objective #A (Patient Action)    Status: 5/7/2025   Use thought-stopping strategy daily to reduce intrusive thoughts  Calm safe state  Proactive communication to get needs met  Prioritize healthy activities involving nature/outdoors.  Prioritize relationships  Practice mindfulness/grounding  [place] kindness meditation  EMDR  Intervention(s)  Therapist will teach skills and assign homework.      Patient has reviewed and agreed to the above plan.      KOTA Bronson  5/7/2025                                             ______________________________________________________________________

## 2025-06-03 ENCOUNTER — LAB (OUTPATIENT)
Dept: LAB | Facility: CLINIC | Age: 45
End: 2025-06-03
Payer: COMMERCIAL

## 2025-06-03 DIAGNOSIS — Z13.1 SCREENING FOR DIABETES MELLITUS: ICD-10-CM

## 2025-06-03 DIAGNOSIS — Z13.220 LIPID SCREENING: ICD-10-CM

## 2025-06-03 LAB
ALBUMIN SERPL BCG-MCNC: 4.5 G/DL (ref 3.5–5.2)
ALP SERPL-CCNC: 45 U/L (ref 40–150)
ALT SERPL W P-5'-P-CCNC: 25 U/L (ref 0–70)
ANION GAP SERPL CALCULATED.3IONS-SCNC: 12 MMOL/L (ref 7–15)
AST SERPL W P-5'-P-CCNC: 26 U/L (ref 0–45)
BILIRUB SERPL-MCNC: 0.5 MG/DL
BUN SERPL-MCNC: 21.9 MG/DL (ref 6–20)
CALCIUM SERPL-MCNC: 9.5 MG/DL (ref 8.8–10.4)
CHLORIDE SERPL-SCNC: 102 MMOL/L (ref 98–107)
CHOLEST SERPL-MCNC: 225 MG/DL
CREAT SERPL-MCNC: 1.33 MG/DL (ref 0.67–1.17)
EGFRCR SERPLBLD CKD-EPI 2021: 68 ML/MIN/1.73M2
FASTING STATUS PATIENT QL REPORTED: YES
FASTING STATUS PATIENT QL REPORTED: YES
GLUCOSE SERPL-MCNC: 89 MG/DL (ref 70–99)
HCO3 SERPL-SCNC: 24 MMOL/L (ref 22–29)
HDLC SERPL-MCNC: 44 MG/DL
LDLC SERPL CALC-MCNC: 164 MG/DL
NONHDLC SERPL-MCNC: 181 MG/DL
POTASSIUM SERPL-SCNC: 4.7 MMOL/L (ref 3.4–5.3)
PROT SERPL-MCNC: 6.9 G/DL (ref 6.4–8.3)
SODIUM SERPL-SCNC: 138 MMOL/L (ref 135–145)
TRIGL SERPL-MCNC: 85 MG/DL

## 2025-06-03 PROCEDURE — 36415 COLL VENOUS BLD VENIPUNCTURE: CPT

## 2025-06-03 PROCEDURE — 80061 LIPID PANEL: CPT

## 2025-06-03 PROCEDURE — 80053 COMPREHEN METABOLIC PANEL: CPT

## 2025-06-05 ENCOUNTER — RESULTS FOLLOW-UP (OUTPATIENT)
Dept: FAMILY MEDICINE | Facility: CLINIC | Age: 45
End: 2025-06-05

## 2025-06-05 DIAGNOSIS — R79.89 ELEVATED SERUM CREATININE: Primary | ICD-10-CM

## 2025-06-11 ENCOUNTER — OFFICE VISIT (OUTPATIENT)
Facility: CLINIC | Age: 45
End: 2025-06-11
Payer: COMMERCIAL

## 2025-06-11 DIAGNOSIS — F41.1 GENERALIZED ANXIETY DISORDER: Primary | ICD-10-CM

## 2025-06-11 PROCEDURE — 90837 PSYTX W PT 60 MINUTES: CPT

## 2025-06-11 NOTE — PROGRESS NOTES
[facility]                                     Progress Note    Patient Name: Ashutosh Velasco  Date: 6/11/25         Service Type: Individual      Session Start Time:  2:00 pm  Session End Time: 2:53 pm     Session Length: 53 min    Session #: 16    Attendees: Client attended alone    Service Modality:  Video Visit:      Provider verified identity through the following two step process.  Patient provided:  Patient is known previously to provider    Telemedicine Visit: The patient's condition can be safely assessed and treated via synchronous audio and visual telemedicine encounter.      Reason for Telemedicine Visit: Patient has requested telehealth visit    Originating Site (Patient Location): Patient's home    Distant Site (Provider Location): Provider Remote Setting- Home Office    Consent:  The patient/guardian has verbally consented to: the potential risks and benefits of telemedicine (video visit) versus in person care; bill my insurance or make self-payment for services provided; and responsibility for payment of non-covered services.     Patient would like the video invitation sent by:  My Chart    Mode of Communication:  Video Conference via [employer]    Distant Location (Provider):  Off-site    As the provider I attest to compliance with applicable laws and regulations related to telemedicine.    DATA  Interactive Complexity: No  Crisis: No     Extended Session (53+ minutes): PROLONGED SERVICE IN THE OUTPATIENT SETTING REQUIRING DIRECT (FACE-TO-FACE) PATIENT CONTACT BEYOND THE USUAL SERVICE:    - Patient's presenting concerns require more intensive intervention than could be completed within the usual service  Interactive Complexity: No  Crisis: No     Progress Since Last Session (Related to Symptoms / Goals / Homework):   Symptoms: Improving anxiety, not drinking alcohol    Homework: Achieved / completed to satisfaction      Episode of Care Goals: Satisfactory progress - ACTION (Actively  working towards change); Intervened by reinforcing change plan / affirming steps taken     Current / Ongoing Stressors and Concerns:   Work stressors. High cholesterol. Wife new job. Working to prioritize leisure plans, continuing AA,leadership, focus on relationship primary and others, treating self with kindness       Treatment Objective(s) Addressed in This Session:   Continue AA  Use thought-stopping strategy daily to reduce intrusive thoughts  Calm safe state  Proactive communication to get needs met  Prioritize healthy activities involving nature/outdoors.  Prioritize relationships  Practice mindfulness/grounding  [place] kindness meditation     Intervention:   Interpersonal Therapy: Provided active listening and validation as patient reported on recent events. Processed insights and growth.  Explored theme of control, impacts, idea of origin.  Validated recent experiences and insights regarding relational dynamic including past elements. Mindfulness meditation/morning meditation/prayer reinforcement. Identified additional avenues for integration of practice. Trichotillomania patterns and triggers, coping skills    Assessments completed prior to visit:  The following assessments were completed by patient for this visit:  PHQ2:       4/24/2025     2:18 PM 2/23/2025     2:43 PM 2/9/2025     4:09 PM 1/27/2025    12:20 PM 10/28/2024     9:55 AM 7/21/2024     3:59 PM 4/18/2024     7:28 PM   PHQ-2 ( 1999 [employer])   Q1: Little interest or pleasure in doing things 0 0 0 0 0 0 0   Q2: Feeling down, depressed or hopeless 0 0 0 0 0 0 0   PHQ-2 Score 0  0  0  0  0  0 0   Q1: Little interest or pleasure in doing things Not at all Not at all Not at all Not at all Not at all Not at all Not at all   Q2: Feeling down, depressed or hopeless Not at all Not at all Not at all Not at all Not at all Not at all Not at all   PHQ-2 Score 0 0 0 0 0 0 0       Patient-reported     PHQ9:       3/26/2018     8:27 AM 3/26/2018     8:28 AM  5/8/2018     3:56 PM 10/17/2018    11:47 AM 11/1/2019     2:14 PM 10/20/2020     1:51 PM 6/30/2024    10:23 AM   PHQ-9 SCORE   PHQ-9 Total Score MyChart 2 (Minimal depression)   1 (Minimal depression)  3 (Minimal depression)  2 (Minimal depression)  0 3 (Minimal depression)   PHQ-9 Total Score  2  1  3  2 0 3       Proxy-reported    Data saved with a previous flowsheet row definition     GAD2:       10/28/2024     9:55 AM 12/18/2024     5:31 PM 1/27/2025    12:20 PM 2/9/2025     4:10 PM 2/23/2025     2:43 PM 4/7/2025     7:41 AM 6/10/2025     4:37 PM   KVNG-2   Feeling nervous, anxious, or on edge 0 0 0 0 0 0 0   Not being able to stop or control worrying 0 0 0 0 0 0 0   KVNG-2 Total Score 0  0  0  0  0  0  0        Patient-reported     GAD7:       10/17/2018    11:47 AM 11/1/2019     2:15 PM 10/20/2020     1:52 PM 5/31/2022     2:19 PM 6/10/2023    10:08 AM 4/18/2024     7:28 PM 4/20/2025    10:37 AM   KVNG-7 SCORE   Total Score 5 (mild anxiety)  4 (minimal anxiety)  2 (minimal anxiety) 2 (minimal anxiety) 0 (minimal anxiety) 2 (minimal anxiety) 1 (minimal anxiety)   Total Score 5 4 2 2 0 2 1        Patient-reported    Proxy-reported     CAGE-AID:       6/30/2024    10:40 AM   CAGE-AID Total Score   Total Score 4   Total Score MyChart 4 (A total score of 2 or greater is considered clinically significant)     PROMIS 10-Global Health (only subscores and total score):       6/30/2024    10:39 AM 10/13/2024    10:27 AM 1/27/2025    12:22 PM 2/9/2025     4:11 PM 2/23/2025     2:44 PM 6/10/2025     4:38 PM   PROMIS-10 Scores Only   Global Mental Health Score 15 16 17  16  15  15    Global Physical Health Score 17 17 17  18  18  16    PROMIS TOTAL - SUBSCORES 32 33 34  34  33  31        Patient-reported     [place] Suicide Severity Rating Scale (Lifetime/Recent)      7/1/2024    11:00 AM   [place] Suicide Severity Rating (Lifetime/Recent)   Q1 Wished to be Dead (Past Month) 0-->no   Q2 Suicidal Thoughts (Past Month) 0-->no    Q6 Suicide Behavior (Lifetime) 0-->no   Level of Risk per Screen no risks indicated         ASSESSMENT: Current Emotional / Mental Status (status of significant symptoms):   Risk status (Self / Other harm or suicidal ideation)   Patient denies current fears or concerns for personal safety.   Patient denies current or recent suicidal ideation or behaviors.   Patient denies current or recent homicidal ideation or behaviors.   Patient denies current or recent self injurious behavior or ideation.   Patient denies other safety concerns.   Patient reports there has been no change in risk factors since their last session.     Patient reports there has been no change in protective factors since their last session.     Recommended that patient call 911 or go to the local ED should there be a change in any of these risk factors     Appearance:   Appropriate    Eye Contact:   Good    Psychomotor Behavior: Normal    Attitude:   Cooperative  Friendly   Orientation:   All   Speech    Rate / Production: Normal     Volume:  Normal    Mood:    Anxious    Affect:    Appropriate    Thought Content:  Clear    Thought Form:  Coherent  Goal Directed  Logical    Insight:    Good      Medication Review:   No changes to current psychiatric medication(s)     Medication Compliance:   Yes     Changes in Health Issues:   None reported     Chemical Use Review:   Substance Use: Currently not drinking, attend AA  3 days a week, has a sponsor     Tobacco Use: No current tobacco use.      Diagnosis:  1. Generalized anxiety disorder          Collateral Reports Completed:   Not Applicable    PLAN: (Patient Tasks / Therapist Tasks / Other)  Continue work on AA steps, attend meetings, continue return to office, use calm safe state/container, Chela, try loving kindness mindfulness meditation, 5 senses mindfulness practice, talk to wife about plans together, need for activity time. Research and plan enjoyable activities, engage in emotional  boundaries.  Practice mindfulness meditation in am, trich coping skills    KOTA Bronson   6/11/2025                                                  Individual Treatment Plan    Patient's Name: Ashutosh Velasco  YOB: 1980    Date of Creation: 10/1/24  Date Treatment Plan Last Reviewed/Revised: 5/7/25    DSM5 Diagnoses: 300.02 (F41.1) Generalized Anxiety Disorder Alcohol use disorder  Psychosocial / Contextual Factors: trauma hx  PROMIS (reviewed every 90 days):     Referral / Collaboration:  Referral to another professional/service is not indicated at this time.    Anticipated number of session for this episode of care: tbd  Anticipation frequency of session: every 2-4 weeks as indicated  Anticipated Duration of each session: 53 or more minutes  Treatment plan will be reviewed in 90 days or when goals have been changed.     MeasurableTreatment Goal(s) related to diagnosis / functional impairment(s)  Goal 1: Patient will maintain sobriety    I will know I've met my goal when I am consistent and sober.      Objective #A (Patient Action)    Patient will maintain sobriety and attend AA weekly 2x or more.  Work on steps  Get more deeply involved In AA  Status: 5/7/2025     Intervention(s)  Therapist will teach support patient's focus on AA.  Goal 2: Patient will address anxiety, using skills    I will know I've met my goal when anxiety is reduced and functioning improves.      Objective #A (Patient Action)    Status: 5/7/2025   Use thought-stopping strategy daily to reduce intrusive thoughts  Calm safe state  Proactive communication to get needs met  Prioritize healthy activities involving nature/outdoors.  Prioritize relationships  Practice mindfulness/grounding  [place] kindness meditation  EMDR  Intervention(s)  Therapist will teach skills and assign homework.      Patient has reviewed and agreed to the above plan.      KOTA Bronson  5/7/2025                                             ______________________________________________________________________

## 2025-07-22 ENCOUNTER — VIRTUAL VISIT (OUTPATIENT)
Facility: CLINIC | Age: 45
End: 2025-07-22
Payer: COMMERCIAL

## 2025-07-22 DIAGNOSIS — F41.1 GENERALIZED ANXIETY DISORDER: Primary | ICD-10-CM

## 2025-07-22 PROCEDURE — 90837 PSYTX W PT 60 MINUTES: CPT | Mod: 95

## 2025-07-22 NOTE — PROGRESS NOTES
[facility]                                     Progress Note    Patient Name: Ashutosh Velasco  Date: 7/22/25         Service Type: Individual      Session Start Time:  9:00 am  Session End Time: 9:53 am     Session Length: 53 min    Session #: 17    Attendees: Client attended alone    Service Modality:  Video Visit:      Provider verified identity through the following two step process.  Patient provided:  Patient is known previously to provider    Telemedicine Visit: The patient's condition can be safely assessed and treated via synchronous audio and visual telemedicine encounter.      Reason for Telemedicine Visit: Patient has requested telehealth visit    Originating Site (Patient Location): Patient's home    Distant Site (Provider Location): Provider Remote Setting- Home Office    Consent:  The patient/guardian has verbally consented to: the potential risks and benefits of telemedicine (video visit) versus in person care; bill my insurance or make self-payment for services provided; and responsibility for payment of non-covered services.     Patient would like the video invitation sent by:  My Chart    Mode of Communication:  [facility]    Distant Location (Provider):  Off-site    As the provider I attest to compliance with applicable laws and regulations related to telemedicine.    DATA  Interactive Complexity: No  Crisis: No     Extended Session (53+ minutes): PROLONGED SERVICE IN THE OUTPATIENT SETTING REQUIRING DIRECT (FACE-TO-FACE) PATIENT CONTACT BEYOND THE USUAL SERVICE:    - Patient's presenting concerns require more intensive intervention than could be completed within the usual service  Interactive Complexity: No  Crisis: No     Progress Since Last Session (Related to Symptoms / Goals / Homework):   Symptoms: Improving anxiety, not drinking alcohol    Homework: Achieved / completed to satisfaction      Episode of Care Goals: Satisfactory progress - ACTION (Actively  working towards change); Intervened by reinforcing change plan / affirming steps taken     Current / Ongoing Stressors and Concerns:   Work stressors. High cholesterol. Wife new job. Working to prioritize leisure plans, continuing AA,leadership, focus on relationship primary and others, treating self with kindness       Treatment Objective(s) Addressed in This Session:   Continue AA  Use thought-stopping strategy daily to reduce intrusive thoughts  Calm safe state  Proactive communication to get needs met  Prioritize healthy activities involving nature/outdoors.  Prioritize relationships  Practice mindfulness/grounding  [place] kindness meditation     Intervention:   Interpersonal Therapy: Provided active listening and validation as patient reported on recent events. Explored theme of control, impacts, idea of origin, dissonance with wife. Validated recent experiences and insights regarding relational dynamic including past elements. Mindfulness meditation/morning meditation/prayer reinforcement. Identified additional avenues for integration of practice. Trichotillomania patterns and triggers, coping skills    Assessments completed prior to visit:  The following assessments were completed by patient for this visit:  PHQ2:       4/24/2025     2:18 PM 2/23/2025     2:43 PM 2/9/2025     4:09 PM 1/27/2025    12:20 PM 10/28/2024     9:55 AM 7/21/2024     3:59 PM 4/18/2024     7:28 PM   PHQ-2 ( 1999 [employer])   Q1: Little interest or pleasure in doing things 0 0 0 0 0 0 0   Q2: Feeling down, depressed or hopeless 0 0 0 0 0 0 0   PHQ-2 Score 0  0  0  0  0  0 0   Q1: Little interest or pleasure in doing things Not at all Not at all Not at all Not at all Not at all Not at all Not at all   Q2: Feeling down, depressed or hopeless Not at all Not at all Not at all Not at all Not at all Not at all Not at all   PHQ-2 Score 0 0 0 0 0 0 0       Patient-reported     PHQ9:       3/26/2018     8:27 AM 3/26/2018     8:28 AM 5/8/2018      3:56 PM 10/17/2018    11:47 AM 11/1/2019     2:14 PM 10/20/2020     1:51 PM 6/30/2024    10:23 AM   PHQ-9 SCORE   PHQ-9 Total Score MyChart 2 (Minimal depression)   1 (Minimal depression)  3 (Minimal depression)  2 (Minimal depression)  0 3 (Minimal depression)   PHQ-9 Total Score  2  1  3  2 0 3       Proxy-reported    Data saved with a previous flowsheet row definition     GAD2:       12/18/2024     5:31 PM 1/27/2025    12:20 PM 2/9/2025     4:10 PM 2/23/2025     2:43 PM 4/7/2025     7:41 AM 6/10/2025     4:37 PM 7/22/2025     8:47 AM   KVNG-2   Feeling nervous, anxious, or on edge 0 0 0 0 0 0 0   Not being able to stop or control worrying 0 0 0 0 0 0 0   KVNG-2 Total Score 0  0  0  0  0  0  0        Patient-reported     GAD7:       10/17/2018    11:47 AM 11/1/2019     2:15 PM 10/20/2020     1:52 PM 5/31/2022     2:19 PM 6/10/2023    10:08 AM 4/18/2024     7:28 PM 4/20/2025    10:37 AM   KVNG-7 SCORE   Total Score 5 (mild anxiety)  4 (minimal anxiety)  2 (minimal anxiety) 2 (minimal anxiety) 0 (minimal anxiety) 2 (minimal anxiety) 1 (minimal anxiety)   Total Score 5 4 2 2 0 2 1        Patient-reported    Proxy-reported     CAGE-AID:       6/30/2024    10:40 AM   CAGE-AID Total Score   Total Score 4   Total Score MyChart 4 (A total score of 2 or greater is considered clinically significant)     PROMIS 10-Global Health (only subscores and total score):       6/30/2024    10:39 AM 10/13/2024    10:27 AM 1/27/2025    12:22 PM 2/9/2025     4:11 PM 2/23/2025     2:44 PM 6/10/2025     4:38 PM   PROMIS-10 Scores Only   Global Mental Health Score 15 16 17  16  15  15    Global Physical Health Score 17 17 17  18  18  16    PROMIS TOTAL - SUBSCORES 32 33 34  34  33  31        Patient-reported     [place] Suicide Severity Rating Scale (Lifetime/Recent)      7/1/2024    11:00 AM   [place] Suicide Severity Rating (Lifetime/Recent)   Q1 Wished to be Dead (Past Month) 0-->no   Q2 Suicidal Thoughts (Past Month) 0-->no   Q6 Suicide  Behavior (Lifetime) 0-->no   Level of Risk per Screen no risks indicated        Data saved with a previous flowsheet row definition         ASSESSMENT: Current Emotional / Mental Status (status of significant symptoms):   Risk status (Self / Other harm or suicidal ideation)   Patient denies current fears or concerns for personal safety.   Patient denies current or recent suicidal ideation or behaviors.   Patient denies current or recent homicidal ideation or behaviors.   Patient denies current or recent self injurious behavior or ideation.   Patient denies other safety concerns.   Patient reports there has been no change in risk factors since their last session.     Patient reports there has been no change in protective factors since their last session.     Recommended that patient call 911 or go to the local ED should there be a change in any of these risk factors     Appearance:   Appropriate    Eye Contact:   Good    Psychomotor Behavior: Normal    Attitude:   Cooperative  Friendly   Orientation:   All   Speech    Rate / Production: Normal     Volume:  Normal    Mood:    Anxious    Affect:    Appropriate    Thought Content:  Clear    Thought Form:  Coherent  Goal Directed  Logical    Insight:    Good      Medication Review:   No changes to current psychiatric medication(s)     Medication Compliance:   Yes     Changes in Health Issues:   None reported     Chemical Use Review:   Substance Use: Currently not drinking, attend AA  3 days a week, has a sponsor     Tobacco Use: No current tobacco use.      Diagnosis:  1. Generalized anxiety disorder          Collateral Reports Completed:   Not Applicable    PLAN: (Patient Tasks / Therapist Tasks / Other)  Continue work on AA steps, attend meetings, continue return to office, use calm safe state/container, Chela, try loving kindness mindfulness meditation, 5 senses mindfulness practice, talk to wife about plans together, need for activity time. Research and plan  enjoyable activities, engage in emotional boundaries.  Practice mindfulness meditation in am (leaves on a stream), trich coping skills    KOTA Bronson   7/22/2025                                                  Individual Treatment Plan    Patient's Name: Ashutosh Velasco  YOB: 1980    Date of Creation: 10/1/24  Date Treatment Plan Last Reviewed/Revised: 5/7/25    DSM5 Diagnoses: 300.02 (F41.1) Generalized Anxiety Disorder Alcohol use disorder  Psychosocial / Contextual Factors: trauma hx  PROMIS (reviewed every 90 days):     Referral / Collaboration:  Referral to another professional/service is not indicated at this time.    Anticipated number of session for this episode of care: tbd  Anticipation frequency of session: every 2-4 weeks as indicated  Anticipated Duration of each session: 53 or more minutes  Treatment plan will be reviewed in 90 days or when goals have been changed.     MeasurableTreatment Goal(s) related to diagnosis / functional impairment(s)  Goal 1: Patient will maintain sobriety    I will know I've met my goal when I am consistent and sober.      Objective #A (Patient Action)    Patient will maintain sobriety and attend AA weekly 2x or more.  Work on steps  Get more deeply involved In AA  Status: 5/7/2025     Intervention(s)  Therapist will teach support patient's focus on AA.  Goal 2: Patient will address anxiety, using skills    I will know I've met my goal when anxiety is reduced and functioning improves.      Objective #A (Patient Action)    Status: 5/7/2025   Use thought-stopping strategy daily to reduce intrusive thoughts  Calm safe state  Proactive communication to get needs met  Prioritize healthy activities involving nature/outdoors.  Prioritize relationships  Practice mindfulness/grounding  [place] kindness meditation  EMDR  Intervention(s)  Therapist will teach skills and assign homework.      Patient has reviewed and agreed to the above plan.      Sabra Carrion  LICSW  5/7/2025                                            ______________________________________________________________________

## 2025-08-12 ENCOUNTER — MYC MEDICAL ADVICE (OUTPATIENT)
Dept: FAMILY MEDICINE | Facility: CLINIC | Age: 45
End: 2025-08-12
Payer: COMMERCIAL

## 2025-08-12 ENCOUNTER — VIRTUAL VISIT (OUTPATIENT)
Facility: CLINIC | Age: 45
End: 2025-08-12
Payer: COMMERCIAL

## 2025-08-12 DIAGNOSIS — F41.1 GENERALIZED ANXIETY DISORDER: Primary | ICD-10-CM

## 2025-08-12 PROCEDURE — 90837 PSYTX W PT 60 MINUTES: CPT | Mod: 95
